# Patient Record
Sex: FEMALE | Race: WHITE | NOT HISPANIC OR LATINO | Employment: OTHER | URBAN - METROPOLITAN AREA
[De-identification: names, ages, dates, MRNs, and addresses within clinical notes are randomized per-mention and may not be internally consistent; named-entity substitution may affect disease eponyms.]

---

## 2017-08-15 ENCOUNTER — HOSPITAL ENCOUNTER (OUTPATIENT)
Dept: LAB | Facility: MEDICAL CENTER | Age: 47
End: 2017-08-15
Attending: INTERNAL MEDICINE
Payer: COMMERCIAL

## 2017-08-15 ENCOUNTER — OFFICE VISIT (OUTPATIENT)
Dept: ENDOCRINOLOGY | Facility: MEDICAL CENTER | Age: 47
End: 2017-08-15
Payer: COMMERCIAL

## 2017-08-15 VITALS
SYSTOLIC BLOOD PRESSURE: 118 MMHG | OXYGEN SATURATION: 93 % | BODY MASS INDEX: 36.97 KG/M2 | WEIGHT: 258.2 LBS | HEIGHT: 70 IN | DIASTOLIC BLOOD PRESSURE: 80 MMHG | HEART RATE: 86 BPM

## 2017-08-15 DIAGNOSIS — R10.13 EPIGASTRIC ABDOMINAL PAIN: ICD-10-CM

## 2017-08-15 DIAGNOSIS — E04.1 THYROID NODULE: ICD-10-CM

## 2017-08-15 DIAGNOSIS — R63.5 WEIGHT GAIN: ICD-10-CM

## 2017-08-15 DIAGNOSIS — E03.8 HYPOTHYROIDISM DUE TO HASHIMOTO'S THYROIDITIS: ICD-10-CM

## 2017-08-15 DIAGNOSIS — E06.3 HYPOTHYROIDISM DUE TO HASHIMOTO'S THYROIDITIS: ICD-10-CM

## 2017-08-15 LAB
T4 FREE SERPL-MCNC: 0.88 NG/DL (ref 0.53–1.43)
TSH SERPL DL<=0.005 MIU/L-ACNC: 1.16 UIU/ML (ref 0.3–3.7)

## 2017-08-15 PROCEDURE — 36415 COLL VENOUS BLD VENIPUNCTURE: CPT

## 2017-08-15 PROCEDURE — 84443 ASSAY THYROID STIM HORMONE: CPT

## 2017-08-15 PROCEDURE — 82784 ASSAY IGA/IGD/IGG/IGM EACH: CPT

## 2017-08-15 PROCEDURE — 83516 IMMUNOASSAY NONANTIBODY: CPT

## 2017-08-15 PROCEDURE — 99204 OFFICE O/P NEW MOD 45 MIN: CPT | Performed by: INTERNAL MEDICINE

## 2017-08-15 PROCEDURE — 84439 ASSAY OF FREE THYROXINE: CPT

## 2017-08-15 RX ORDER — LEVOTHYROXINE SODIUM 125 MCG
125 TABLET ORAL
COMMUNITY
End: 2017-09-05

## 2017-08-15 NOTE — MR AVS SNAPSHOT
"        Mary Hull   8/15/2017 7:20 AM   Office Visit   MRN: [id number]    Department:  Endocrinology [facility]   Dept Phone:  [phone]    Description:  Female : 1970   Provider:  Angelina Cleveland M.D.           Reason for Visit     New Patient Hashimotos      Allergies as of 8/15/2017     Allergen Noted Reactions    Sulfa Drugs 08/15/2017       rash    Tetracycline 08/15/2017   Hives    rash    Latex 08/15/2017       Tape 08/15/2017       Plastic tape      You were diagnosed with     Hypothyroidism due to Hashimoto's thyroiditis   [5933934]       Thyroid nodule   [295476]       Weight gain   [143842]       Epigastric abdominal pain   [208397]         Vital Signs     Blood Pressure Pulse Height Weight Body Mass Index Oxygen Saturation    118/80 mmHg 86 1.778 m (5' 10\") 117.119 kg (258 lb 3.2 oz) 37.05 kg/m2 93%    Smoking Status                   Never Smoker            Basic Information     Date Of Birth Sex Race Ethnicity Preferred Language    1970 Female White Non- English      Your appointments     2018  9:00 AM   Established Patient with Angelina Cleveland M.D.   [facility] ([facility])    [address], [address]  [place] [address] [phone]           You will be receiving a confirmation call a few days before your appointment from our automated call confirmation system.              Health Maintenance     Patient has no pending health maintenance at this time      Current Immunizations     No immunizations on file.      Below and/or attached are the medications your provider expects you to take. Review all of your home medications and newly ordered medications with your provider and/or pharmacist. Follow medication instructions as directed by your provider and/or pharmacist. Please keep your medication list with you and share with your provider. Update the information when medications are discontinued, doses are changed, or new " medications (including over-the-counter products) are added; and carry medication information at all times in the event of emergency situations     Allergies:  SULFA DRUGS - (reactions not documented)     TETRACYCLINE - Hives     LATEX - (reactions not documented)     TAPE - (reactions not documented)               Medications  Valid as of: August 15, 2017 -  7:57 AM    Generic Name Brand Name Tablet Size Instructions for use    Aspirin (Tab) aspirin 81 MG Take 81 mg by mouth every day.        Fexofenadine HCl   Take  by mouth.        Levothyroxine Sodium (Tab) SYNTHROID 125 MCG Take 125 mcg by mouth Every morning on an empty stomach.        Multiple Vitamin   Take  by mouth.        Omega-3 Fatty Acids   Take 2,000 mg by mouth.        .                 Medicines prescribed today were sent to:     [id number] - [place], [place] - [address]    [address] [address]    Phone: [phone] Fax: [phone]    Open 24 Hours?: No      Medication refill instructions:       If your prescription bottle indicates you have medication refills left, it is not necessary to call your provider’s office. Please contact your pharmacy and they will refill your medication.    If your prescription bottle indicates you do not have any refills left, you may request refills at any time through one of the following ways: The online [employer] system (except Urgent Care), by calling your provider’s office, or by asking your pharmacy to contact your provider’s office with a refill request. Medication refills are processed only during regular business hours and may not be available until the next business day. Your provider may request additional information or to have a follow-up visit with you prior to refilling your medication.   *Please Note: Medication refills are assigned a new Rx number when refilled electronically. Your pharmacy may indicate that no refills were authorized even though a new prescription for the same  medication is available at the pharmacy. Please request the medicine by name with the pharmacy before contacting your provider for a refill.        Your To Do List     Future Labs/Procedures Complete By Expires    CELIAC DISEASE AB PANEL  As directed 8/15/2018    FREE THYROXINE  As directed 8/15/2018    FREE THYROXINE  As directed 8/15/2018    TSH  As directed 8/15/2018    TSH  As directed 8/15/2018    URINE CORTISOL 24 HR, ICMA  As directed 8/15/2018    URINE CREATININE 24 HR  As directed 8/15/2018    US-GALLBLADDER  As directed 8/15/2018    US-SOFT TISSUES OF HEAD - NECK  As directed 8/15/2018         [employer] retargetinghart Access Code: Activation code not generated  Current [employer] Status: Active

## 2017-08-15 NOTE — PROGRESS NOTES
New Patient Consult Note  Primary care physician: Crow DE LEON M.D.    Reason for consult: Hypothyroidism    HPI:  Mary Hull is a 47 y.o. old patient who comes in today for evaluation of hypothyroidism.    Hypothyroidism: She was diagnosed with hypothyroidism over 10 years ago. She is currently on Synthroid 125 µg daily. She reports compliance with medications. She has difficulty losing weight.    Thyroid nodules: Thyroid ultrasound in 2012 showed subcentimeter thyroid nodules. No family history of thyroid cancer. She has occasional anterior neck discomfort.    Weight gain: She has gained over 20 pounds in the past year. Lab workup 2 months ago showed FSH/LH in postmenopausal range. She had hysterectomy several years ago, ovaries were left intact. She has hot flashes. She is not on hormone replacement therapy. We discussed about pros and cons of postmenopausal hormone replacement therapy. She will discuss about estrogen replacement with her OB/GYN physician.    Abdominal pain: For the past couple of years she has been experiencing episodic epigastric abdominal discomfort, associated with abdominal cramping/spasms of abdominal muscles. CT abdomen in 2016 showed tiny gallstones.    ROS:  Constitutional: Positive for weight gain  Cardiac: No palpitations or racing heart  Resp: No shortness of breath  GI: Positive for abdominal discomfort  GYN: Positive for hot flashes  All other systems were reviewed and were negative.    Past Medical History:  Patient Active Problem List    Diagnosis Date Noted   • Hypothyroidism due to Hashimoto's thyroiditis 08/15/2017   • Thyroid nodule 08/15/2017       Past Surgical History:  Past Surgical History   Procedure Laterality Date   • Vaginal hysterectomy scope total  8/22/08     Performed by KATIA PANDEY at [email or web address]   • Cystoscopy  8/22/08     Performed by KATIA PANDEY at [facility]       Allergies:  Sulfa drugs;  "Tetracycline; Latex; and Tape    Social History:  Social History     Social History   • Marital Status:      Spouse Name: N/A   • Number of Children: N/A   • Years of Education: N/A     Occupational History   • Not on file.     Social History Main Topics   • Smoking status: Never Smoker    • Smokeless tobacco: Not on file   • Alcohol Use: Yes   • Drug Use: Not on file   • Sexual Activity: Not on file     Other Topics Concern   • Not on file     Social History Narrative       Family History:  Family History   Problem Relation Age of Onset   • Cancer Mother      breast   • Heart Disease Mother    • Heart Disease Father    • Diabetes Father    • Kidney Disease Father    • Thyroid Sister        Medications:    Current outpatient prescriptions:   •  SYNTHROID 125 MCG Tab, Take 125 mcg by mouth Every morning on an empty stomach., Disp: , Rfl:   •  aspirin 81 MG tablet, Take 81 mg by mouth every day., Disp: , Rfl:   •  Omega-3 Fatty Acids (FISH OIL PO), Take 2,000 mg by mouth., Disp: , Rfl:   •  Multiple Vitamin (MULTIVITAMINS PO), Take  by mouth., Disp: , Rfl:   •  Fexofenadine HCl (ALLEGRA ALLERGY PO), Take  by mouth., Disp: , Rfl:     Labs:  Labs from May 2017: Hemoglobin 13.9, a.m. cortisol 8.3, TSH 1.26, LH 36.4, FSH 65.8, estradiol less than 5.0, total T4 10.7    Physical Examination:  Vital signs: /80 mmHg  Pulse 86  Ht 1.778 m (5' 10\")  Wt 117.119 kg (258 lb 3.2 oz)  BMI 37.05 kg/m2  SpO2 93%  General: No apparent distress, cooperative  Eyes: No scleral icterus or discharge  ENMT: Normal on external inspection of nose, lips  Neck: No abnormal masses on inspection  Resp: Normal effort, clear to auscultation bilaterally   CVS: Regular rate and rhythm, S1 S2 normal, no murmur   Extremities: No edema  Neuro: Alert and oriented  Skin: No rash  Psych: Normal mood and affect    Assessment and Plan:    1. Hypothyroidism due to Hashimoto's thyroiditis  · Labs in May showed TSH 1.26  · Continue Synthroid " 125 µg daily  · Repeat labs for TSH and free T4 now and then again in 6 months  - TSH; Future  - FREE THYROXINE; Future  - TSH; Future  - FREE THYROXINE; Future    2. Thyroid nodule  · Thyroid ultrasound in 2012 showed to subcentimeter thyroid nodules  · No family history of thyroid cancer  · We will repeat thyroid ultrasound now  - US-SOFT TISSUES OF HEAD - NECK; Future    3. Weight gain  · We will check 24-hour urine cortisol to rule out hypercortisolism, clinically appears unlikely  - URINE CREATININE 24 HR; Future  - URINE CORTISOL 24 HR, ICMA; Future    4. Epigastric abdominal pain  · We will check celiac disease panel and obtain an ultrasound of gallbladder  - US-GALLBLADDER; Future  - CELIAC DISEASE AB PANEL; Future    Return in about 6 months (around 2/15/2018).    Thank you for allowing me to participate in the care of this patient.    Angelina Cleveland M.D.  08/15/2017    CC:   Crow DE LEON M.D.    This note was created using voice recognition software (Dragon). The accuracy of the dictation is limited by the abilities of the software. I have reviewed the note prior to signing, however some errors in grammar and context are still possible. If you have any questions related to this note please do not hesitate to contact our office.

## 2017-08-16 ENCOUNTER — HOSPITAL ENCOUNTER (OUTPATIENT)
Dept: RADIOLOGY | Facility: MEDICAL CENTER | Age: 47
End: 2017-08-16
Attending: OBSTETRICS & GYNECOLOGY
Payer: COMMERCIAL

## 2017-08-16 DIAGNOSIS — Z12.31 VISIT FOR SCREENING MAMMOGRAM: ICD-10-CM

## 2017-08-16 LAB — IGA SERPL-MCNC: 246 MG/DL (ref 68–408)

## 2017-08-16 PROCEDURE — G0202 SCR MAMMO BI INCL CAD: HCPCS

## 2017-08-17 ENCOUNTER — HOSPITAL ENCOUNTER (OUTPATIENT)
Facility: MEDICAL CENTER | Age: 47
End: 2017-08-17
Attending: INTERNAL MEDICINE
Payer: COMMERCIAL

## 2017-08-17 DIAGNOSIS — R63.5 WEIGHT GAIN: ICD-10-CM

## 2017-08-17 LAB
CREAT 24H UR-MSRATE: 1751 MG/24 HR (ref 800–1800)
CREAT UR-MCNC: 152.3 MG/DL
SPECIMEN VOL UR: 1150 ML
TTG IGA SER IA-ACNC: 1 U/ML (ref 0–3)

## 2017-08-17 PROCEDURE — 82570 ASSAY OF URINE CREATININE: CPT

## 2017-08-17 PROCEDURE — 81050 URINALYSIS VOLUME MEASURE: CPT

## 2017-08-17 PROCEDURE — 82530 CORTISOL FREE: CPT

## 2017-08-20 LAB
ANNOTATION COMMENT IMP: NORMAL
CORTIS F 24H UR HPLC-MCNC: 14 UG/L
CORTIS F 24H UR-MRATE: 16.1 UG/D
CORTIS F/CREAT 24H UR: 10.37 UG/G CRT
CREAT 24H UR-MCNC: 135 MG/DL
CREAT 24H UR-MRATE: 1552 MG/D (ref 700–1600)
SPECIMEN VOL ?TM UR: 1150 ML

## 2017-08-22 ENCOUNTER — HOSPITAL ENCOUNTER (OUTPATIENT)
Dept: RADIOLOGY | Facility: MEDICAL CENTER | Age: 47
End: 2017-08-22
Attending: INTERNAL MEDICINE
Payer: COMMERCIAL

## 2017-08-22 DIAGNOSIS — E04.1 THYROID NODULE: ICD-10-CM

## 2017-08-22 DIAGNOSIS — R10.13 EPIGASTRIC ABDOMINAL PAIN: ICD-10-CM

## 2017-08-22 PROCEDURE — 76536 US EXAM OF HEAD AND NECK: CPT

## 2017-08-22 PROCEDURE — 76705 ECHO EXAM OF ABDOMEN: CPT

## 2017-09-05 DIAGNOSIS — E03.8 HYPOTHYROIDISM DUE TO HASHIMOTO'S THYROIDITIS: ICD-10-CM

## 2017-09-05 DIAGNOSIS — E06.3 HYPOTHYROIDISM DUE TO HASHIMOTO'S THYROIDITIS: ICD-10-CM

## 2017-09-05 RX ORDER — LEVOTHYROXINE SODIUM 112 UG/1
112 TABLET ORAL
Qty: 30 TAB | Refills: 6 | Status: SHIPPED | OUTPATIENT
Start: 2017-09-05 | End: 2017-10-24 | Stop reason: SDUPTHER

## 2017-09-05 RX ORDER — LIOTHYRONINE SODIUM 5 UG/1
5 TABLET ORAL 2 TIMES DAILY
Qty: 60 TAB | Refills: 6 | Status: SHIPPED | OUTPATIENT
Start: 2017-09-05 | End: 2017-10-24 | Stop reason: SDUPTHER

## 2017-10-24 DIAGNOSIS — E03.8 HYPOTHYROIDISM DUE TO HASHIMOTO'S THYROIDITIS: ICD-10-CM

## 2017-10-24 DIAGNOSIS — E06.3 HYPOTHYROIDISM DUE TO HASHIMOTO'S THYROIDITIS: ICD-10-CM

## 2017-10-24 RX ORDER — LIOTHYRONINE SODIUM 5 UG/1
5 TABLET ORAL 2 TIMES DAILY
Qty: 180 TAB | Refills: 0 | Status: SHIPPED | OUTPATIENT
Start: 2017-10-24 | End: 2018-08-22 | Stop reason: SDUPTHER

## 2017-10-24 RX ORDER — LEVOTHYROXINE SODIUM 112 UG/1
112 TABLET ORAL
Qty: 90 TAB | Refills: 0 | Status: SHIPPED | OUTPATIENT
Start: 2017-10-24 | End: 2018-01-15 | Stop reason: SDUPTHER

## 2018-01-15 DIAGNOSIS — E03.8 HYPOTHYROIDISM DUE TO HASHIMOTO'S THYROIDITIS: ICD-10-CM

## 2018-01-15 DIAGNOSIS — E06.3 HYPOTHYROIDISM DUE TO HASHIMOTO'S THYROIDITIS: ICD-10-CM

## 2018-01-16 RX ORDER — LEVOTHYROXINE SODIUM 112 MCG
112 TABLET ORAL
Qty: 90 TAB | Refills: 1 | Status: SHIPPED | OUTPATIENT
Start: 2018-01-16 | End: 2018-07-19 | Stop reason: SDUPTHER

## 2018-02-10 LAB
ERYTHROCYTE [DISTWIDTH] IN BLOOD BY AUTOMATED COUNT: 14.4 % (ref 12.3–15.4)
FERRITIN SERPL-MCNC: 207 NG/ML (ref 15–150)
HCT VFR BLD AUTO: 42.5 % (ref 34–46.6)
HGB BLD-MCNC: 14.2 G/DL (ref 11.1–15.9)
IRON SATN MFR SERPL: 24 % (ref 15–55)
IRON SERPL-MCNC: 62 UG/DL (ref 27–159)
MCH RBC QN AUTO: 29.9 PG (ref 26.6–33)
MCHC RBC AUTO-ENTMCNC: 33.4 G/DL (ref 31.5–35.7)
MCV RBC AUTO: 90 FL (ref 79–97)
NRBC BLD AUTO-RTO: NORMAL %
PLATELET # BLD AUTO: 255 X10E3/UL (ref 150–379)
RBC # BLD AUTO: 4.75 X10E6/UL (ref 3.77–5.28)
T3FREE SERPL-MCNC: 3.1 PG/ML (ref 2–4.4)
T4 FREE SERPL-MCNC: 1.29 NG/DL (ref 0.82–1.77)
TIBC SERPL-MCNC: 263 UG/DL (ref 250–450)
TSH SERPL DL<=0.005 MIU/L-ACNC: 0.59 UIU/ML (ref 0.45–4.5)
UIBC SERPL-MCNC: 201 UG/DL (ref 131–425)
WBC # BLD AUTO: 7.9 X10E3/UL (ref 3.4–10.8)

## 2018-02-14 ENCOUNTER — OFFICE VISIT (OUTPATIENT)
Dept: ENDOCRINOLOGY | Facility: MEDICAL CENTER | Age: 48
End: 2018-02-14
Payer: COMMERCIAL

## 2018-02-14 VITALS
WEIGHT: 258.6 LBS | HEIGHT: 70 IN | DIASTOLIC BLOOD PRESSURE: 78 MMHG | BODY MASS INDEX: 37.02 KG/M2 | HEART RATE: 100 BPM | OXYGEN SATURATION: 97 % | SYSTOLIC BLOOD PRESSURE: 119 MMHG

## 2018-02-14 DIAGNOSIS — E06.3 HYPOTHYROIDISM DUE TO HASHIMOTO'S THYROIDITIS: ICD-10-CM

## 2018-02-14 DIAGNOSIS — E03.8 HYPOTHYROIDISM DUE TO HASHIMOTO'S THYROIDITIS: ICD-10-CM

## 2018-02-14 DIAGNOSIS — E04.2 MULTIPLE THYROID NODULES: ICD-10-CM

## 2018-02-14 DIAGNOSIS — Z78.0 POSTMENOPAUSAL STATE: ICD-10-CM

## 2018-02-14 PROCEDURE — 99214 OFFICE O/P EST MOD 30 MIN: CPT | Performed by: INTERNAL MEDICINE

## 2018-02-14 RX ORDER — ESTRADIOL 0.05 MG/D
1 PATCH, EXTENDED RELEASE TRANSDERMAL
COMMUNITY

## 2018-02-14 NOTE — PROGRESS NOTES
"Endocrinology Clinic Progress Note    CC: Hypothyroidism    HPI:  Sae Foss is a 47 y.o. old patient who comes in today for routine follow up.     Hypothyroidism: She is currently on Synthroid 112 µg daily and Cytomel 5 µg twice a day. Reports compliance with medications. Energy levels are better.    Multiple thyroid nodules: No difficulty swallowing or breathing. Thyroid ultrasound in August of multiple subcentimeter nodules stable compared to prior ultrasound.    Postmenopausal state: She is now on estrogen patch, feels better after starting estrogen replacement. Follows with OB/GYN.    ROS:  Constitutional: No unintentional weight loss  Endo: Denies excessive thirst or frequent urination    PMH:  Patient Active Problem List   Diagnosis   • Hypothyroidism due to Hashimoto's thyroiditis   • Thyroid nodule     EXAM:  Vital signs: /78   Pulse 100   Ht 1.778 m (5' 10\")   Wt 117.3 kg (258 lb 9.6 oz)   SpO2 97%   BMI 37.11 kg/m²   General: No apparent distress, cooperative  Eyes: No scleral icterus, no discharge  Neck: Normal on external inspection  Resp: Normal effort  Musculoskeletal: Normal gait  Extremities: No lower extremity edema  Skin: No rash on visible skin  Psych: Alert and oriented, normal mood and affect    LABS:  Results for SAE FOSS (MRN [id number]) as of 2/14/2018 09:19   Ref. Range 2/9/2018 09:18   TSH Latest Ref Range: 0.450 - 4.500 uIU/mL 0.590   Free T-4 Latest Ref Range: 0.82 - 1.77 ng/dL 1.29   T3,Free Latest Ref Range: 2.0 - 4.4 pg/mL 3.1     Assessment and Plan:    1. Hypothyroidism due to Hashimoto's thyroiditis  · Continue Synthroid 112 µg daily and Cytomel 5 µg twice a day  · Repeat labs for TSH and free T4 in 6 months, and in between if needed  - TSH; Future  - FREE THYROXINE; Future  - TRIIDOTHYRONINE; Future  - TSH; Future  - FREE THYROXINE; Future  - TRIIDOTHYRONINE; Future    2. Multiple thyroid nodules  · Repeat thyroid ultrasound in August 2018  - US-SOFT " TISSUES OF HEAD - NECK; Future    3. Postmenopausal state  · Now on estrogen replacement patch    Return in about 6 months (around 8/14/2018).    Thank you for allowing me to participate in the care of this patient.    Angelina Cleveland M.D.    CC:   Crow DE LEON M.D.    This note was created using voice recognition software (Dragon). The accuracy of the dictation is limited by the abilities of the software. I have reviewed the note prior to signing, however some errors in grammar and context are still possible. If you have any questions related to this note please do not hesitate to contact our office.

## 2018-05-22 DIAGNOSIS — M79.10 MYALGIA: ICD-10-CM

## 2018-05-22 DIAGNOSIS — E03.9 ACQUIRED HYPOTHYROIDISM: ICD-10-CM

## 2018-06-09 LAB
25(OH)D3+25(OH)D2 SERPL-MCNC: 25.2 NG/ML (ref 30–100)
ALBUMIN SERPL-MCNC: 4.2 G/DL (ref 3.5–5.5)
ALBUMIN/GLOB SERPL: 1.9 {RATIO} (ref 1.2–2.2)
ALP SERPL-CCNC: 61 IU/L (ref 39–117)
ALT SERPL-CCNC: 21 IU/L (ref 0–32)
AST SERPL-CCNC: 21 IU/L (ref 0–40)
BILIRUB SERPL-MCNC: 0.3 MG/DL (ref 0–1.2)
BUN SERPL-MCNC: 14 MG/DL (ref 6–24)
BUN/CREAT SERPL: 14 (ref 9–23)
CALCIUM SERPL-MCNC: 9.2 MG/DL (ref 8.7–10.2)
CHLORIDE SERPL-SCNC: 101 MMOL/L (ref 96–106)
CK SERPL-CCNC: 157 U/L (ref 24–173)
CO2 SERPL-SCNC: 23 MMOL/L (ref 18–29)
CREAT SERPL-MCNC: 1.02 MG/DL (ref 0.57–1)
ERYTHROCYTE [SEDIMENTATION RATE] IN BLOOD BY WESTERGREN METHOD: 2 MM/HR (ref 0–32)
GFR SERPLBLD CREATININE-BSD FMLA CKD-EPI: 65 ML/MIN/1.73
GFR SERPLBLD CREATININE-BSD FMLA CKD-EPI: 75 ML/MIN/1.73
GLOBULIN SER CALC-MCNC: 2.2 G/DL (ref 1.5–4.5)
GLUCOSE SERPL-MCNC: 90 MG/DL (ref 65–99)
POTASSIUM SERPL-SCNC: 4.3 MMOL/L (ref 3.5–5.2)
PROT SERPL-MCNC: 6.4 G/DL (ref 6–8.5)
SODIUM SERPL-SCNC: 140 MMOL/L (ref 134–144)
T4 FREE SERPL-MCNC: 1.41 NG/DL (ref 0.82–1.77)
TSH SERPL DL<=0.005 MIU/L-ACNC: 0.48 UIU/ML (ref 0.45–4.5)

## 2018-06-12 DIAGNOSIS — E55.9 VITAMIN D DEFICIENCY: ICD-10-CM

## 2018-06-12 RX ORDER — ERGOCALCIFEROL 1.25 MG/1
50000 CAPSULE ORAL
Qty: 12 CAP | Refills: 0 | Status: SHIPPED | OUTPATIENT
Start: 2018-06-12 | End: 2018-09-03 | Stop reason: SDUPTHER

## 2018-07-17 ENCOUNTER — APPOINTMENT (OUTPATIENT)
Dept: RADIOLOGY | Facility: MEDICAL CENTER | Age: 48
End: 2018-07-17
Attending: INTERNAL MEDICINE
Payer: COMMERCIAL

## 2018-07-19 DIAGNOSIS — E03.8 HYPOTHYROIDISM DUE TO HASHIMOTO'S THYROIDITIS: ICD-10-CM

## 2018-07-19 DIAGNOSIS — E06.3 HYPOTHYROIDISM DUE TO HASHIMOTO'S THYROIDITIS: ICD-10-CM

## 2018-07-19 RX ORDER — LEVOTHYROXINE SODIUM 112 MCG
112 TABLET ORAL
Qty: 90 TAB | Refills: 1 | Status: SHIPPED | OUTPATIENT
Start: 2018-07-19 | End: 2019-02-06 | Stop reason: SDUPTHER

## 2018-07-19 NOTE — TELEPHONE ENCOUNTER
Was the patient seen in the last year in this department? Yes     Does patient have an active prescription for medications requested? No     Received Request Via: Pharmacy     SYNTHROID 112 MCG Tab  TAKE 1 TAB BY MOUTH EVERY MORNING ON AN EMPTY STOMACH.

## 2018-07-20 ENCOUNTER — HOSPITAL ENCOUNTER (OUTPATIENT)
Dept: RADIOLOGY | Facility: MEDICAL CENTER | Age: 48
End: 2018-07-20
Attending: INTERNAL MEDICINE
Payer: COMMERCIAL

## 2018-07-20 DIAGNOSIS — E04.2 MULTIPLE THYROID NODULES: ICD-10-CM

## 2018-07-20 PROCEDURE — 76536 US EXAM OF HEAD AND NECK: CPT

## 2018-08-06 ENCOUNTER — OFFICE VISIT (OUTPATIENT)
Dept: ENDOCRINOLOGY | Facility: MEDICAL CENTER | Age: 48
End: 2018-08-06
Payer: COMMERCIAL

## 2018-08-06 VITALS
DIASTOLIC BLOOD PRESSURE: 78 MMHG | HEART RATE: 78 BPM | OXYGEN SATURATION: 95 % | WEIGHT: 257.6 LBS | SYSTOLIC BLOOD PRESSURE: 124 MMHG | HEIGHT: 70 IN | BODY MASS INDEX: 36.88 KG/M2

## 2018-08-06 DIAGNOSIS — E03.8 HYPOTHYROIDISM DUE TO HASHIMOTO'S THYROIDITIS: ICD-10-CM

## 2018-08-06 DIAGNOSIS — E04.2 MULTIPLE THYROID NODULES: ICD-10-CM

## 2018-08-06 DIAGNOSIS — E55.9 VITAMIN D DEFICIENCY: ICD-10-CM

## 2018-08-06 DIAGNOSIS — E06.3 HYPOTHYROIDISM DUE TO HASHIMOTO'S THYROIDITIS: ICD-10-CM

## 2018-08-06 DIAGNOSIS — E66.9 OBESITY (BMI 30-39.9): ICD-10-CM

## 2018-08-06 PROCEDURE — 99214 OFFICE O/P EST MOD 30 MIN: CPT | Performed by: INTERNAL MEDICINE

## 2018-08-06 NOTE — PROGRESS NOTES
"Endocrinology Clinic Progress Note    CC: Hypothyroidism    HPI:  1. Hypothyroidism due to Hashimoto's thyroiditis  She is currently on Synthroid 112 mcg daily and Cytomel 5 mcg twice a day.  She reports compliance with medications.  Overall feels well.    2. Vitamin D deficiency  She is currently on vitamin D 50,000 IUs weekly.  She reports compliance of medications.    3. Multiple thyroid nodules  Thyroid ultrasound in July 2018 showed essentially stable appearance of thyroid nodules compared to August 2017, she did have a new subcentimeter nodule in the left thyroid lobe without any high-risk features.    4. Obesity (BMI 30-39.9)  She has been working on diet and lifestyle modification, has difficulty losing weight    ROS:  Constitutional: Negative for unintentional weight loss  Cardiac: Negative for palpitations    PMH:  Patient Active Problem List   Diagnosis   • Hypothyroidism due to Hashimoto's thyroiditis   • Thyroid nodule     EXAM:  Vital signs: /78   Pulse 78   Ht 1.778 m (5' 10\")   Wt 116.8 kg (257 lb 9.6 oz)   SpO2 95%   BMI 36.96 kg/m²   General: No apparent distress, cooperative  Eyes: No scleral icterus, no discharge  Neck: Normal on external inspection  Resp: Normal effort, clear to auscultation bilaterally  CVS: Regular rate and rhythm, S1 S2 normal  Musculoskeletal: Normal gait  Extremities: No lower extremity edema  Skin: No rash on visible skin  Psych: Alert and oriented, normal mood and affect    Assessment and Plan:    1. Hypothyroidism due to Hashimoto's thyroiditis  · Continue Synthroid 112 mcg daily and Cytomel 5 mcg twice a day  - TSH; Standing  - FREE THYROXINE; Standing    2. Vitamin D deficiency  · Continue weekly high-dose vitamin D  · Repeat labs in 6 weeks  - VITAMIN D,25 HYDROXY; Future    3. Multiple thyroid nodules  · We will repeat thyroid ultrasound in July 2019    4. Obesity (BMI 30-39.9)  - REFERRAL TO [facility] IMPROVEMENT PROGRAMS (HIP) Services Requested: " Physician Medical Weight Management Program; Reason for Referral? BMI>30; Reason for Visit: Overweight/Obesity    Return in about 6 months (around 2/6/2019).    Thank you for allowing me to participate in the care of this patient.    Angelina Cleveland M.D.    CC:   Crow DE LEON M.D.    This note was created using voice recognition software (Dragon). The accuracy of the dictation is limited by the abilities of the software. I have reviewed the note prior to signing, however some errors in grammar and context are still possible. If you have any questions related to this note please do not hesitate to contact our office.

## 2018-08-22 DIAGNOSIS — E03.8 HYPOTHYROIDISM DUE TO HASHIMOTO'S THYROIDITIS: ICD-10-CM

## 2018-08-22 DIAGNOSIS — E06.3 HYPOTHYROIDISM DUE TO HASHIMOTO'S THYROIDITIS: ICD-10-CM

## 2018-08-22 RX ORDER — LIOTHYRONINE SODIUM 5 UG/1
5 TABLET ORAL 2 TIMES DAILY
Qty: 180 TAB | Refills: 2 | Status: SHIPPED | OUTPATIENT
Start: 2018-08-22 | End: 2019-07-22 | Stop reason: SDUPTHER

## 2018-11-12 DIAGNOSIS — E55.9 VITAMIN D DEFICIENCY: ICD-10-CM

## 2018-11-12 LAB — 25(OH)D3+25(OH)D2 SERPL-MCNC: 26.5 NG/ML (ref 30–100)

## 2018-11-12 RX ORDER — ERGOCALCIFEROL 1.25 MG/1
50000 CAPSULE ORAL
Qty: 26 CAP | Refills: 0 | Status: SHIPPED | OUTPATIENT
Start: 2018-11-12 | End: 2019-04-04

## 2018-11-16 ENCOUNTER — HOSPITAL ENCOUNTER (OUTPATIENT)
Dept: RADIOLOGY | Facility: MEDICAL CENTER | Age: 48
End: 2018-11-16
Attending: OBSTETRICS & GYNECOLOGY
Payer: COMMERCIAL

## 2018-11-16 DIAGNOSIS — Z12.31 SCREENING MAMMOGRAM, ENCOUNTER FOR: ICD-10-CM

## 2018-11-16 PROCEDURE — 77067 SCR MAMMO BI INCL CAD: CPT

## 2018-12-04 DIAGNOSIS — E55.9 VITAMIN D DEFICIENCY: ICD-10-CM

## 2018-12-04 RX ORDER — ERGOCALCIFEROL 1.25 MG/1
CAPSULE ORAL
Qty: 26 CAP | Refills: 1 | Status: SHIPPED | OUTPATIENT
Start: 2018-12-04 | End: 2020-01-16

## 2019-02-05 DIAGNOSIS — E03.8 HYPOTHYROIDISM DUE TO HASHIMOTO'S THYROIDITIS: ICD-10-CM

## 2019-02-05 DIAGNOSIS — E06.3 HYPOTHYROIDISM DUE TO HASHIMOTO'S THYROIDITIS: ICD-10-CM

## 2019-02-05 RX ORDER — LEVOTHYROXINE SODIUM 112 UG/1
112 TABLET ORAL
Qty: 90 TAB | Refills: 1 | Status: CANCELLED | OUTPATIENT
Start: 2019-02-05

## 2019-02-06 DIAGNOSIS — E03.8 HYPOTHYROIDISM DUE TO HASHIMOTO'S THYROIDITIS: ICD-10-CM

## 2019-02-06 DIAGNOSIS — E06.3 HYPOTHYROIDISM DUE TO HASHIMOTO'S THYROIDITIS: ICD-10-CM

## 2019-02-06 RX ORDER — LEVOTHYROXINE SODIUM 112 UG/1
112 TABLET ORAL
Qty: 90 TAB | Refills: 0 | Status: SHIPPED | OUTPATIENT
Start: 2019-02-06 | End: 2019-05-06 | Stop reason: SDUPTHER

## 2019-02-06 NOTE — TELEPHONE ENCOUNTER
Was the patient seen in the last year in this department? Yes  **LOV 8/6/18 with Dr. Cleveland. N2U 2/26/19**    Does patient have an active prescription for medications requested? Yes    Received Request Via: Patient

## 2019-02-12 DIAGNOSIS — E55.9 VITAMIN D DEFICIENCY: ICD-10-CM

## 2019-02-12 RX ORDER — ERGOCALCIFEROL 1.25 MG/1
50000 CAPSULE ORAL
Qty: 26 CAP | Refills: 1 | OUTPATIENT
Start: 2019-02-12

## 2019-02-12 NOTE — TELEPHONE ENCOUNTER
Was the patient seen in the last year in this department? Yes  **LOV 8/6/18 w/ Dr Cleveland. N2U 2/26/19**    Does patient have an active prescription for medications requested? Yes    Received Request Via: Patient

## 2019-02-26 ENCOUNTER — OFFICE VISIT (OUTPATIENT)
Dept: ENDOCRINOLOGY | Facility: MEDICAL CENTER | Age: 49
End: 2019-02-26
Payer: COMMERCIAL

## 2019-02-26 VITALS
RESPIRATION RATE: 16 BRPM | WEIGHT: 256 LBS | DIASTOLIC BLOOD PRESSURE: 70 MMHG | OXYGEN SATURATION: 95 % | HEART RATE: 91 BPM | SYSTOLIC BLOOD PRESSURE: 120 MMHG | BODY MASS INDEX: 36.65 KG/M2 | HEIGHT: 70 IN

## 2019-02-26 DIAGNOSIS — E06.3 HYPOTHYROIDISM DUE TO HASHIMOTO'S THYROIDITIS: ICD-10-CM

## 2019-02-26 DIAGNOSIS — R53.83 FATIGUE, UNSPECIFIED TYPE: ICD-10-CM

## 2019-02-26 DIAGNOSIS — E04.1 THYROID NODULE: ICD-10-CM

## 2019-02-26 DIAGNOSIS — E66.9 CLASS 2 OBESITY WITHOUT SERIOUS COMORBIDITY IN ADULT, UNSPECIFIED BMI, UNSPECIFIED OBESITY TYPE: ICD-10-CM

## 2019-02-26 DIAGNOSIS — E03.8 HYPOTHYROIDISM DUE TO HASHIMOTO'S THYROIDITIS: ICD-10-CM

## 2019-02-26 DIAGNOSIS — E55.9 VITAMIN D DEFICIENCY: ICD-10-CM

## 2019-02-26 PROCEDURE — 99214 OFFICE O/P EST MOD 30 MIN: CPT | Performed by: PHYSICIAN ASSISTANT

## 2019-02-27 NOTE — PROGRESS NOTES
"Endocrinology Clinic Progress Note      HPI:  Mary Hull is a 49 y.o. old patient who comes in today for routine follow up.     Previously followed by Dr. Cleveland     1. Hypothyroidism due to Hashimoto's thyroiditis  She has been struggling with weight for the last several years (since hysterectomy). She is active exercising 2-3 times a week with a diet of 1700 calorie. Patient cooks her meals and tries to avoid high fatty foods.     She is currently on:   Synthroid 112 mcg daily (5-6 am)  and Cytomel 5 mcg (in the am 9 am with food and then again 4-5 pm ES. Patient is having symptoms of reflux with Cytomel.   She reports compliance with medications.  Overall feels well.     2. Thyroid nodule  According to the patient, she is feeling \"more aware\" of her thyroid and a sensation of fullness in her neck.     Patient denies  voice changes, hoarseness, neck pain, or difficulty swallowing, dysphonia, cough, enlarged cervical lymph nodes, sweating, tremors, heat intolerance.      No family history of thyroid cancer     2. Vitamin D deficiency  She is currently on vitamin D 50,000 IUs weekly (Tuesday).  She reports compliance of medications.      Endocrinology History:   1. Hypothyroidism due to Hashimoto's thyroiditis  6/18- 0.484, FT4 1.41-  2/18- 0.590, Ft4 1.29, FT3 3.1   8/15/17- TSH 1.160, FT4 0.88     2. Vitamin D deficiency  11/18- 26.5  6/18-25.2    3. Multiple thyroid nodules  Thyroid ultrasound 7/18-showed essentially stable appearance of thyroid nodules compared to August 2017, she did have a new subcentimeter nodule in the left thyroid lobe without any high-risk features.     4. Obesity (BMI 30-39.9)  Wt Readings from Last 1 Encounters:   02/26/19 116.1 kg (256 lb)     2/ lbs   8/ lbs - \" loose it diet\" 1750 diet, exercise  3 times a week not really loosing weight.      ROS:  Constitutional: No fever, chills, lightheaded, dizziness, nausea, vomiting, diarrhea, shakes, tremors, constipation " "    Medications:    Current Outpatient Prescriptions:   •  levothyroxine, 112 mcg, Oral, AM ES, 2/26/2019  •  vitamin D (Ergocalciferol), TAKE ONE CAPSULE BY MOUTH EVERY 7 DAYS, 2/26/2019  •  liothyronine, 5 mcg, Oral, BID, 2/26/2019  •  estradiol, 1 Patch, Transdermal, Q7 DAYS, 2/26/2019  •  aspirin, 81 mg, Oral, DAILY, 2/26/2019  •  Omega-3 Fatty Acids (FISH OIL PO), Take 2,000 mg by mouth., 2/26/2019  •  Multiple Vitamin (MULTIVITAMINS PO), Take  by mouth., 2/26/2019  •  Fexofenadine HCl (ALLEGRA ALLERGY PO), Take  by mouth., 2/26/2019  •  vitamin D (Ergocalciferol), 50,000 Units, Oral, 2X A WEEK    EXAM:  Vital signs: /70 (BP Location: Right arm, Patient Position: Sitting, BP Cuff Size: Large adult)   Pulse 91   Resp 16   Ht 1.778 m (5' 10\")   Wt 116.1 kg (256 lb)   SpO2 95%   BMI 36.73 kg/m²   General: No apparent distress, cooperative, pleasant   Eyes: No scleral icterus, no discharge  Thyroid: enlarged no bruits   Resp: Normal effort no audible wheeze, talking in full sentences.   Psych: Alert and oriented, normal mood and affect    Assessment and Plan:    1. Hypothyroidism due to Hashimoto's thyroiditis  Synthroid 112 mcg daily (5-6 am)  and Cytomel 5 mcg (in the am 9 am with food and then again 4-5 pm ES.    Ordered labs for review at next appt   - TSH; Future  - TRIIDOTHYRONINE; Future  - FREE THYROXINE  - T3 FREE; Future    2. Thyroid nodule  Repeat July 2019     3. Vitamin D deficiency  Continue Vitamin D replacement   - VITAMIN D,25 HYDROXY; Future    4. Class 2 obesity without serious comorbidity in adult, unspecified BMI, unspecified obesity type  Discussed in great length with the patient today diet and exercise requirements.   Will refer to diet/nutritionist     - Lipid Profile; Future  - Comp Metabolic Panel; Future  - HEMOGLOBIN A1C; Future  - REFERRAL TO [facility] HEALTH IMPROVEMENT PROGRAMS (HIP) Services Requested: Registered Dietitian Medicare Obesity Counseling; Reason for Visit: " Overweight/Obesity, BMI of 25 or higher and A1C of 5.7-6.4    5. Fatigue, unspecified type  - VITAMIN B12; Future    Return in about 4 weeks (around 3/26/2019).    Thank you for allowing me to participate in the care of this patient.    Tessa Shaver P.A.-C.    CC:   Crow DE LEON M.D.    This note was created using voice recognition software (Dragon). The accuracy of the dictation is limited by the abilities of the software. I have reviewed the note prior to signing, however some errors in grammar and context are still possible. If you have any questions related to this note please do not hesitate to contact our office.

## 2019-02-27 NOTE — PROGRESS NOTES
I called patient and left a vm letting her know to call back and schedule her 4 week appointment.

## 2019-03-09 LAB
25(OH)D3+25(OH)D2 SERPL-MCNC: 32.2 NG/ML (ref 30–100)
ALBUMIN SERPL-MCNC: 4 G/DL (ref 3.5–5.5)
ALBUMIN/GLOB SERPL: 1.7 {RATIO} (ref 1.2–2.2)
ALP SERPL-CCNC: 57 IU/L (ref 39–117)
ALT SERPL-CCNC: 23 IU/L (ref 0–32)
AST SERPL-CCNC: 23 IU/L (ref 0–40)
BILIRUB SERPL-MCNC: 0.4 MG/DL (ref 0–1.2)
BUN SERPL-MCNC: 15 MG/DL (ref 6–24)
BUN/CREAT SERPL: 15 (ref 9–23)
CALCIUM SERPL-MCNC: 9 MG/DL (ref 8.7–10.2)
CHLORIDE SERPL-SCNC: 104 MMOL/L (ref 96–106)
CHOLEST SERPL-MCNC: 177 MG/DL (ref 100–199)
CO2 SERPL-SCNC: 21 MMOL/L (ref 20–29)
CREAT SERPL-MCNC: 1.01 MG/DL (ref 0.57–1)
GLOBULIN SER CALC-MCNC: 2.3 G/DL (ref 1.5–4.5)
GLUCOSE SERPL-MCNC: 89 MG/DL (ref 65–99)
HBA1C MFR BLD: 6 % (ref 4.8–5.6)
HDLC SERPL-MCNC: 40 MG/DL
LABORATORY COMMENT REPORT: ABNORMAL
LDLC SERPL CALC-MCNC: 115 MG/DL (ref 0–99)
POTASSIUM SERPL-SCNC: 4.6 MMOL/L (ref 3.5–5.2)
PROT SERPL-MCNC: 6.3 G/DL (ref 6–8.5)
SODIUM SERPL-SCNC: 140 MMOL/L (ref 134–144)
T3 SERPL-MCNC: 141 NG/DL (ref 71–180)
T3FREE SERPL-MCNC: 3.3 PG/ML (ref 2–4.4)
T4 FREE SERPL-MCNC: 1.25 NG/DL (ref 0.82–1.77)
TRIGL SERPL-MCNC: 109 MG/DL (ref 0–149)
TSH SERPL DL<=0.005 MIU/L-ACNC: 0.43 UIU/ML (ref 0.45–4.5)
VIT B12 SERPL-MCNC: 617 PG/ML (ref 232–1245)
VLDLC SERPL CALC-MCNC: 22 MG/DL (ref 5–40)

## 2019-03-11 PROBLEM — E55.9 VITAMIN D DEFICIENCY: Status: ACTIVE | Noted: 2019-03-11

## 2019-03-11 PROBLEM — E66.9 CLASS 2 OBESITY WITHOUT SERIOUS COMORBIDITY IN ADULT: Status: ACTIVE | Noted: 2019-03-11

## 2019-03-11 PROBLEM — E66.812 CLASS 2 OBESITY WITHOUT SERIOUS COMORBIDITY IN ADULT: Status: ACTIVE | Noted: 2019-03-11

## 2019-03-11 NOTE — ASSESSMENT & PLAN NOTE
"  Wt Readings from Last 1 Encounters:   02/26/19 116.1 kg (256 lb)     2/ lbs   8/ lbs - \" loose it diet\" 1750 diet, exercise  3 times a week not really loosing weight.  "

## 2019-03-11 NOTE — ASSESSMENT & PLAN NOTE
Thyroid ultrasound 7/18-showed essentially stable appearance of thyroid nodules compared to August 2017, she did have a new subcentimeter nodule in the left thyroid lobe without any high-risk features.

## 2019-03-22 ENCOUNTER — NON-PROVIDER VISIT (OUTPATIENT)
Dept: HEALTH INFORMATION MANAGEMENT | Facility: MEDICAL CENTER | Age: 49
End: 2019-03-22
Payer: COMMERCIAL

## 2019-03-22 VITALS — BODY MASS INDEX: 37.24 KG/M2 | HEIGHT: 70 IN | WEIGHT: 260.1 LBS

## 2019-03-22 DIAGNOSIS — E66.09 CLASS 2 OBESITY DUE TO EXCESS CALORIES WITHOUT SERIOUS COMORBIDITY IN ADULT, UNSPECIFIED BMI: ICD-10-CM

## 2019-03-22 PROCEDURE — 97802 MEDICAL NUTRITION INDIV IN: CPT | Performed by: DIETITIAN, REGISTERED

## 2019-03-22 NOTE — PROGRESS NOTES
"3/22/2019    Crow DE LEON M.D.  49 y.o.   Time in/out: 112 - 1299    Anthropometrics/Objective  Vitals:    03/22/19 1310   Weight: 118 kg (260 lb 1.6 oz)   Height: 1.778 m (5' 10\")       Body mass index is 37.32 kg/m².    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    Biochemical data, medical test and procedures  Lab Results   Component Value Date/Time    HBA1C 6.0 (H) 03/08/2019 10:15 AM     Lab Results   Component Value Date/Time    CHOLSTRLTOT 177 03/08/2019 10:15 AM     (H) 03/08/2019 10:15 AM    HDL 40 03/08/2019 10:15 AM    TRIGLYCERIDE 109 03/08/2019 10:15 AM         Nutrition Intervention  Nutrition Prescription  Recommended Daily Kcals 7769-7798    Meal and Snack  Recommend a general/healthful diet    Comprehensive Nutrition education Instruction or training leading to in-depth nutrition related knowledge about:  Combine carb, protein and fat at each meal, Meal timing and spacing, Metabolism of carb, protein, fat, Portion control and Handouts provided regarding topics discussed    Monitoring & Evaluation Plan  Behavioral-Environmental:  Behavior:  Continue tracking your food intake  Physical activity:  Continue as tolerated    Food / Nutrient Intake:  Energy intake:  5473-0980 kcal/day    Physical Signs / Symptoms:  Weight change:  Weight loss to goal      Assessment Notes:  My goal is to help Mary improve her health and hopefully she can lose some weight as well.  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    I also helped Mary adjust the goals in her food tracking kelley because it was set at 1,700 kcal, and I do not think that is actually enough kcal for her.  We confirmed this through the [employer] weight loss calculator and she agrees that she likely needs to increase her kcal intake.  It is worth a shot to see if this makes a difference for her and we can make adjustments in the future, if necessary.  I want to see her again in one month to see how she is doing.

## 2019-03-28 ENCOUNTER — APPOINTMENT (OUTPATIENT)
Dept: ENDOCRINOLOGY | Facility: MEDICAL CENTER | Age: 49
End: 2019-03-28

## 2019-04-04 ENCOUNTER — OFFICE VISIT (OUTPATIENT)
Dept: ENDOCRINOLOGY | Facility: MEDICAL CENTER | Age: 49
End: 2019-04-04
Payer: COMMERCIAL

## 2019-04-04 VITALS
BODY MASS INDEX: 36.94 KG/M2 | HEART RATE: 87 BPM | SYSTOLIC BLOOD PRESSURE: 110 MMHG | DIASTOLIC BLOOD PRESSURE: 70 MMHG | HEIGHT: 70 IN | WEIGHT: 258 LBS | OXYGEN SATURATION: 98 %

## 2019-04-04 DIAGNOSIS — E06.3 HYPOTHYROIDISM DUE TO HASHIMOTO'S THYROIDITIS: ICD-10-CM

## 2019-04-04 DIAGNOSIS — E03.8 HYPOTHYROIDISM DUE TO HASHIMOTO'S THYROIDITIS: ICD-10-CM

## 2019-04-04 DIAGNOSIS — E04.1 THYROID NODULE: ICD-10-CM

## 2019-04-04 DIAGNOSIS — R73.03 PRE-DIABETES: ICD-10-CM

## 2019-04-04 DIAGNOSIS — E66.9 CLASS 2 OBESITY WITHOUT SERIOUS COMORBIDITY IN ADULT, UNSPECIFIED BMI, UNSPECIFIED OBESITY TYPE: ICD-10-CM

## 2019-04-04 DIAGNOSIS — E55.9 VITAMIN D DEFICIENCY: ICD-10-CM

## 2019-04-04 PROCEDURE — 99214 OFFICE O/P EST MOD 30 MIN: CPT | Performed by: PHYSICIAN ASSISTANT

## 2019-04-04 RX ORDER — METFORMIN HYDROCHLORIDE 500 MG/1
500 TABLET, EXTENDED RELEASE ORAL 2 TIMES DAILY
Qty: 60 TAB | Refills: 1 | Status: SHIPPED | OUTPATIENT
Start: 2019-04-04 | End: 2019-05-14 | Stop reason: SDUPTHER

## 2019-04-04 NOTE — PROGRESS NOTES
"Endocrinology Clinic Progress Note  PCP: Crow DE LEON M.D.    HPI:  Mary Hull is a 49 y.o. old patient who comes in today for review of endocrine problems.    1. Hypothyroidism due to Hashimoto's thyroiditis  Synthroid 112 mcg daily (5-6 am)  and Cytomel 5 mcg (in the am 9 am with food and then again 7 pm ES.  She does notice when she takes her Cytomel that late in the afternoon she does have symptoms of reflux.  Her majority of her symptoms of fatigue usually are around 4 PM.    2. Thyroid nodule  Patient denies symptoms of sensation of a throat mass, voice changes, hoarseness, neck pain, or difficulty swallowing, dysphonia, cough, enlarged cervical lymph nodes, sweating, tremors, heat intolerance    3. Vitamin D deficiency  She is currently on vitamin D replacement    4. Class 2 obesity without serious comorbidity in adult, unspecified BMI, unspecified obesity type  She recently met with Fritz from nutritionist he has given her diet with macro micro changes.  Healthy snacking.  She is scheduled to meet with him in the near future.    She is here to review her labs.    Endocrine history to date:   1. Hypothyroidism due to Hashimoto's thyroiditis  3/8/2019- 0.429, FT4 1.25, FT  6/18- 0.484, FT4 1.41-  2/18- 0.590, Ft4 1.29, FT3 3.1   8/15/17- TSH 1.160, FT4 0.88      2. Vitamin D deficiency  3/8/2019 vitamin D 32.2  3/8/2019- 17083 IU weekly    11/18- 26.5  6/18-25.2     3. Multiple thyroid nodules  Thyroid ultrasound 7/18-showed essentially stable appearance of thyroid nodules compared to August 2017, she did have a new subcentimeter nodule in the left thyroid lobe without any high-risk features.     4. Obesity (BMI 30-39.9)  4/19 weight 258 pounds  2/26/2019-256  8/ lbs - \" loose it diet\" 1750 diet, exercise  3 times a week not really loosing weight.    Patient has been following with Fritz from nutrition and diabetes    5.  Prediabetes   3/8/2019 glucose 89, A1c 6.0,      6.  Elevated " LDL-   3/8/2019 total cholesterol 177, triglycerides 109, HDL 40, ,    7.  Fatigue  3/8/2019 vitamin     ROS:  Constitutional: No weight loss or gain,  fever  HEENT: No difficulty with swallowing, change in voice, or swelling in throat area   Cardiac: No chest pain, palpitations, or racing heart  Resp: No shortness of breath  GI: No abdominal pain, nausea, vomiting, or diarrhea   Neuro: No numbness or tinging in feet  Endo: No heat or cold intolerance, no polyuria or polydipsia      Past Medical History:  Patient Active Problem List    Diagnosis Date Noted   • Vitamin D deficiency 03/11/2019   • Class 2 obesity without serious comorbidity in adult 03/11/2019   • Hypothyroidism due to Hashimoto's thyroiditis 08/15/2017   • Thyroid nodule 08/15/2017     Social History     Social History   • Marital status:      Spouse name: N/A   • Number of children: N/A   • Years of education: N/A     Occupational History   • Not on file.     Social History Main Topics   • Smoking status: Never Smoker   • Smokeless tobacco: Never Used   • Alcohol use Yes   • Drug use: No   • Sexual activity: Not on file     Other Topics Concern   • Not on file     Social History Narrative   • No narrative on file     Family History   Problem Relation Age of Onset   • Cancer Mother         breast   • Heart Disease Mother    • Heart Disease Father    • Diabetes Father    • Kidney Disease Father    • Thyroid Sister          Medications:    Current Outpatient Prescriptions:   •  metFORMIN ER (GLUCOPHAGE XR) 500 MG TABLET SR 24 HR, Take 1 Tab by mouth 2 times a day., Disp: 60 Tab, Rfl: 1  •  levothyroxine (SYNTHROID) 112 MCG Tab, Take 1 Tab by mouth Every morning on an empty stomach., Disp: 90 Tab, Rfl: 0  •  vitamin D, Ergocalciferol, (DRISDOL) 90983 units Cap capsule, TAKE ONE CAPSULE BY MOUTH EVERY 7 DAYS, Disp: 26 Cap, Rfl: 1  •  liothyronine (CYTOMEL) 5 MCG Tab, Take 1 Tab by mouth 2 Times a Day., Disp: 180 Tab, Rfl: 2  •   "estradiol (VIVELLE DOT) 0.05 MG/24HR PATCH BIWEEKLY, Apply 1 Patch to skin as directed every 7 days., Disp: , Rfl:   •  aspirin 81 MG tablet, Take 81 mg by mouth every day., Disp: , Rfl:   •  Omega-3 Fatty Acids (FISH OIL PO), Take 2,000 mg by mouth., Disp: , Rfl:   •  Multiple Vitamin (MULTIVITAMINS PO), Take  by mouth., Disp: , Rfl:   •  Fexofenadine HCl (ALLEGRA ALLERGY PO), Take  by mouth., Disp: , Rfl:     Labs: Reviewed as above     Physical Examination:  Vital signs: /70 (BP Location: Left arm, Patient Position: Sitting, BP Cuff Size: Large adult)   Pulse 87   Ht 1.778 m (5' 10\")   Wt 117 kg (258 lb)   SpO2 98%   BMI 37.02 kg/m²  Body mass index is 37.02 kg/m².  General: No apparent distress, cooperative, well dressed   Eyes: No scleral icterus, no discharge, normal eyelids  Neck: No abnormal masses on inspection, normal thyroid exam  Resp: Normal effort, clear to auscultation bilaterally  CVS: Regular rate and rhythm, S1 S2 normal, no murmur  Extremities: No lower extremity edema  Abdomen: abdominal obesity present  Musculoskeletal: Normal digits and nails  Skin: No rash on visible skin  Psych: Alert and oriented, normal mood and affect, intact memory and able to make informed decisions.    Assessment and Plan:  1. Hypothyroidism due to Hashimoto's thyroiditis  Chronic condition managed with current medical regimen continue her current dose of Synthroid  - FREE THYROXINE; Future  - T3 FREE; Future  - TSH; Future  - TRIIDOTHYRONINE; Future    2. Thyroid nodule  Chronic stable condition managed with ultrasound surveillance  - US-SOFT TISSUES OF HEAD - NECK; Future    3. Vitamin D deficiency  Stable I have encouraged patient to take 2000 IU vitamin D drops    4. Class 2 obesity without serious comorbidity in adult, unspecified BMI, unspecified obesity type  Stable patient is currently working with Fritz with regards to weight management.  We did potentially discuss the addition of a GLP/phentermine " in the future to see if we can stimulate her metabolic system with regards to weight loss.  She is doing a phenomenal job with regards to diet and exercise however is extremely frustrated with regards to weight and prediabetes.  - Comp Metabolic Panel; Future    5. Pre-diabetes  New condition for patient.  A1c 6.0.  Glucose was less than 100.  We discussed the potential of insulin resistance and at this point I would like to start her on metformin to see if this stimulates some weight loss and potentially help with insulin resistance.  We reviewed side effect profile with regards to metformin.  She is agreeable.    - metFORMIN ER (GLUCOPHAGE XR) 500 MG TABLET SR 24 HR; Take 1 Tab by mouth 2 times a day.  Dispense: 60 Tab; Refill: 1  - HEMOGLOBIN A1C; Future    Return in about 3 months (around 7/4/2019).    Thank you for allowing me to participate in the care of this patient.  If you have any questions or concerns please do not hesitate to contact me.    Tessa Shaver P.A.-C.    CC:   Crow DE LEON M.D.    This note was created using voice recognition software (Dragon). The accuracy of the dictation is limited by the abilities of the software. I have reviewed the note prior to signing, however some errors in grammar and context are still possible. If you have any questions related to this note please do not hesitate to contact our office.

## 2019-04-19 ENCOUNTER — NON-PROVIDER VISIT (OUTPATIENT)
Dept: HEALTH INFORMATION MANAGEMENT | Facility: MEDICAL CENTER | Age: 49
End: 2019-04-19
Payer: COMMERCIAL

## 2019-04-19 VITALS — HEIGHT: 70 IN | BODY MASS INDEX: 36.46 KG/M2 | WEIGHT: 254.7 LBS

## 2019-04-19 DIAGNOSIS — E66.09 CLASS 2 OBESITY DUE TO EXCESS CALORIES WITHOUT SERIOUS COMORBIDITY IN ADULT, UNSPECIFIED BMI: ICD-10-CM

## 2019-04-19 PROCEDURE — 97803 MED NUTRITION INDIV SUBSEQ: CPT | Performed by: DIETITIAN, REGISTERED

## 2019-04-19 NOTE — PROGRESS NOTES
"Nutrition Reassess    4/19/2019    Crow DE LEON M.D.   49 y.o.   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    Previous weight/date: 260.1#  Change:  - 5.4#       ReAssesment/Notes:  Mary is still tracking and learning about the CHO content of foods and has been avoiding eating large amount of CHO-containing foods, like fruit.  She is feeling very comfortable with her current eating habits and wants to continue them at this time.     She likely has lost some weight by decreasing her CHO intake as well as the addition of metformin.  She is feeling more comfortable with taking the medication after initially \"freaking out\" about being given the medication.    PLAN:   1.  Continue to track you intake and learn about how much you are eating.   2.  Continue to exercise as tolerated.    Follow-up: 1 month    "

## 2019-05-06 ENCOUNTER — PATIENT MESSAGE (OUTPATIENT)
Dept: ENDOCRINOLOGY | Facility: MEDICAL CENTER | Age: 49
End: 2019-05-06

## 2019-05-06 DIAGNOSIS — E03.8 HYPOTHYROIDISM DUE TO HASHIMOTO'S THYROIDITIS: ICD-10-CM

## 2019-05-06 DIAGNOSIS — E06.3 HYPOTHYROIDISM DUE TO HASHIMOTO'S THYROIDITIS: ICD-10-CM

## 2019-05-06 RX ORDER — LEVOTHYROXINE SODIUM 112 UG/1
112 TABLET ORAL
Qty: 90 TAB | Refills: 0 | Status: SHIPPED | OUTPATIENT
Start: 2019-05-06 | End: 2019-07-22

## 2019-05-06 NOTE — PATIENT COMMUNICATION
Was the patient seen in the last year in this department? Yes    Does patient have an active prescription for medications requested? No     Received Request Via: Patient     Levothyroxine 112mcg

## 2019-05-06 NOTE — TELEPHONE ENCOUNTER
From: Sae Hull  To: Tessa Shaver P.A.-C.  Sent: 5/6/2019 8:29 AM PDT  Subject: Prescription Question    Good day !  I'm have zero refills on my 112 mcg SYNTHROID. Could you please refill this RX ASAP? My pharmacy is [facility] on [address].   Thanks and have an awesome day !   SAE

## 2019-05-14 DIAGNOSIS — R73.03 PRE-DIABETES: ICD-10-CM

## 2019-05-14 RX ORDER — METFORMIN HYDROCHLORIDE 500 MG/1
500 TABLET, EXTENDED RELEASE ORAL 2 TIMES DAILY
Qty: 60 TAB | Refills: 0 | Status: SHIPPED | OUTPATIENT
Start: 2019-05-14 | End: 2019-06-25 | Stop reason: SDUPTHER

## 2019-05-14 NOTE — TELEPHONE ENCOUNTER
Was the patient seen in the last year in this department? Yes    Does patient have an active prescription for medications requested? Yes    Received Request Via: Pharmacy       Last OV 04/04/19  Last Labs 03/08/19

## 2019-05-17 ENCOUNTER — NON-PROVIDER VISIT (OUTPATIENT)
Dept: HEALTH INFORMATION MANAGEMENT | Facility: MEDICAL CENTER | Age: 49
End: 2019-05-17
Payer: COMMERCIAL

## 2019-05-17 VITALS — WEIGHT: 251 LBS | BODY MASS INDEX: 35.93 KG/M2 | HEIGHT: 70 IN

## 2019-05-17 DIAGNOSIS — E66.09 CLASS 2 OBESITY DUE TO EXCESS CALORIES WITHOUT SERIOUS COMORBIDITY IN ADULT, UNSPECIFIED BMI: ICD-10-CM

## 2019-05-17 PROCEDURE — 97803 MED NUTRITION INDIV SUBSEQ: CPT | Performed by: DIETITIAN, REGISTERED

## 2019-05-17 NOTE — PROGRESS NOTES
"Nutrition Reassess    5/17/2019    Crow DE LEON M.D.   49 y.o.   Time in/out:  928 - 946    Subjective:  -States she is really busy with her son graduating from high school and busy at work  -Has felt tired for ~2 weeks at about 1530 every day  -Has not been sleeping well for ~2 weeks d/t everything going on  -States her clothing is fitting better  -Has found that eating protein in the morning has been very important to hunger and energy levels    Anthropometrics/Objective  Vitals:    05/17/19 0948   Weight: 113.9 kg (251 lb)   Height: 1.778 m (5' 10\")     Body mass index is 36.01 kg/m².    Previous weight/date: 254.7#  Change:  - 3.7# (- 9.1# total)     ReAssesment/Notes:  Mary's fatigue in the afternoon is likely d/t the normal circadian rhythm of the body coupled with her poor sleep recently; she can make it through the fatigue by taking a quick walk or eating her scheduled snack with some protein.    For this next month I want Mary to focus more on her mental health, because she occasionally gets discouraged and does not want to track her food intake and then feels bad.  I have given her permission to take \"mental health days\" where she does not track while keeping her eating habits similar to when she does.  She also is going to not weigh herself weekly if she is focusing too much on her weight and not as much on other non-scale victories, like her clothes fitting better.    We will see each other again in a few weeks, before she goes to [place] for parent orientation at [employer].    Follow-up: 1 month    "

## 2019-06-14 ENCOUNTER — APPOINTMENT (OUTPATIENT)
Dept: HEALTH INFORMATION MANAGEMENT | Facility: MEDICAL CENTER | Age: 49
End: 2019-06-14

## 2019-06-25 DIAGNOSIS — R73.03 PRE-DIABETES: ICD-10-CM

## 2019-06-25 RX ORDER — METFORMIN HYDROCHLORIDE 500 MG/1
500 TABLET, EXTENDED RELEASE ORAL 2 TIMES DAILY
Qty: 60 TAB | Refills: 6 | Status: SHIPPED | OUTPATIENT
Start: 2019-06-25 | End: 2020-01-21 | Stop reason: SDUPTHER

## 2019-06-25 NOTE — TELEPHONE ENCOUNTER
Was the patient seen in the last year in this department? Yes    Does patient have an active prescription for medications requested? Yes    Received Request Via: Patient     **Last office visit 4/4/19**

## 2019-07-08 ENCOUNTER — APPOINTMENT (OUTPATIENT)
Dept: ENDOCRINOLOGY | Facility: MEDICAL CENTER | Age: 49
End: 2019-07-08

## 2019-07-19 LAB
ALBUMIN SERPL-MCNC: 4.5 G/DL (ref 3.5–5.5)
ALBUMIN/GLOB SERPL: 2 {RATIO} (ref 1.2–2.2)
ALP SERPL-CCNC: 55 IU/L (ref 39–117)
ALT SERPL-CCNC: 24 IU/L (ref 0–32)
AST SERPL-CCNC: 24 IU/L (ref 0–40)
BILIRUB SERPL-MCNC: 0.4 MG/DL (ref 0–1.2)
BUN SERPL-MCNC: 17 MG/DL (ref 6–24)
BUN/CREAT SERPL: 15 (ref 9–23)
CALCIUM SERPL-MCNC: 9.3 MG/DL (ref 8.7–10.2)
CHLORIDE SERPL-SCNC: 102 MMOL/L (ref 96–106)
CO2 SERPL-SCNC: 22 MMOL/L (ref 20–29)
CREAT SERPL-MCNC: 1.15 MG/DL (ref 0.57–1)
GLOBULIN SER CALC-MCNC: 2.2 G/DL (ref 1.5–4.5)
GLUCOSE SERPL-MCNC: 95 MG/DL (ref 65–99)
HBA1C MFR BLD: 6 % (ref 4.8–5.6)
POTASSIUM SERPL-SCNC: 4.4 MMOL/L (ref 3.5–5.2)
PROT SERPL-MCNC: 6.7 G/DL (ref 6–8.5)
SODIUM SERPL-SCNC: 138 MMOL/L (ref 134–144)
T3 SERPL-MCNC: 108 NG/DL (ref 71–180)
T3FREE SERPL-MCNC: 2.7 PG/ML (ref 2–4.4)
T4 FREE SERPL-MCNC: 1.48 NG/DL (ref 0.82–1.77)
TSH SERPL DL<=0.005 MIU/L-ACNC: 0.62 UIU/ML (ref 0.45–4.5)

## 2019-07-22 ENCOUNTER — HOSPITAL ENCOUNTER (OUTPATIENT)
Dept: RADIOLOGY | Facility: MEDICAL CENTER | Age: 49
End: 2019-07-22
Attending: PHYSICIAN ASSISTANT
Payer: COMMERCIAL

## 2019-07-22 ENCOUNTER — OFFICE VISIT (OUTPATIENT)
Dept: ENDOCRINOLOGY | Facility: MEDICAL CENTER | Age: 49
End: 2019-07-22
Payer: COMMERCIAL

## 2019-07-22 VITALS
OXYGEN SATURATION: 96 % | HEIGHT: 70 IN | WEIGHT: 248 LBS | DIASTOLIC BLOOD PRESSURE: 68 MMHG | HEART RATE: 91 BPM | BODY MASS INDEX: 35.5 KG/M2 | SYSTOLIC BLOOD PRESSURE: 100 MMHG

## 2019-07-22 DIAGNOSIS — R73.03 PRE-DIABETES: ICD-10-CM

## 2019-07-22 DIAGNOSIS — E04.1 THYROID NODULE: ICD-10-CM

## 2019-07-22 DIAGNOSIS — E55.9 VITAMIN D DEFICIENCY: ICD-10-CM

## 2019-07-22 DIAGNOSIS — E03.8 HYPOTHYROIDISM DUE TO HASHIMOTO'S THYROIDITIS: ICD-10-CM

## 2019-07-22 DIAGNOSIS — E06.3 HYPOTHYROIDISM DUE TO HASHIMOTO'S THYROIDITIS: ICD-10-CM

## 2019-07-22 DIAGNOSIS — E66.9 CLASS 2 OBESITY WITHOUT SERIOUS COMORBIDITY IN ADULT, UNSPECIFIED BMI, UNSPECIFIED OBESITY TYPE: ICD-10-CM

## 2019-07-22 PROCEDURE — 76536 US EXAM OF HEAD AND NECK: CPT

## 2019-07-22 PROCEDURE — 99214 OFFICE O/P EST MOD 30 MIN: CPT | Performed by: PHYSICIAN ASSISTANT

## 2019-07-22 RX ORDER — LEVOTHYROXINE SODIUM 112 MCG
112 TABLET ORAL
Qty: 90 TAB | Refills: 6 | Status: SHIPPED | OUTPATIENT
Start: 2019-07-22 | End: 2020-01-21 | Stop reason: SDUPTHER

## 2019-07-22 RX ORDER — LIOTHYRONINE SODIUM 5 UG/1
5 TABLET ORAL 2 TIMES DAILY
Qty: 180 TAB | Refills: 2 | Status: SHIPPED | OUTPATIENT
Start: 2019-07-22 | End: 2020-01-21 | Stop reason: SDUPTHER

## 2019-12-27 ENCOUNTER — HOSPITAL ENCOUNTER (OUTPATIENT)
Dept: RADIOLOGY | Facility: MEDICAL CENTER | Age: 49
End: 2019-12-27
Attending: OBSTETRICS & GYNECOLOGY
Payer: COMMERCIAL

## 2019-12-27 DIAGNOSIS — Z12.31 SCREENING MAMMOGRAM FOR HIGH-RISK PATIENT: ICD-10-CM

## 2019-12-27 PROCEDURE — 77063 BREAST TOMOSYNTHESIS BI: CPT

## 2020-01-16 DIAGNOSIS — E55.9 VITAMIN D DEFICIENCY: ICD-10-CM

## 2020-01-16 RX ORDER — ERGOCALCIFEROL 1.25 MG/1
CAPSULE ORAL
Qty: 12 CAP | Refills: 4 | Status: SHIPPED | OUTPATIENT
Start: 2020-01-16 | End: 2020-01-21 | Stop reason: SDUPTHER

## 2020-01-16 NOTE — TELEPHONE ENCOUNTER
Was the patient seen in the last year in this department? Yes    Does patient have an active prescription for medications requested? Yes    Received Request Via: Pharmacy     [facility] w/ Dr. Cardoza 1/21/20    ergocalciferol (DRISDOL) 08926 UNIT capsule        Sig: TAKE ONE CAPSULE BY MOUTH EVERY 7 DAYS

## 2020-01-21 ENCOUNTER — OFFICE VISIT (OUTPATIENT)
Dept: ENDOCRINOLOGY | Facility: MEDICAL CENTER | Age: 50
End: 2020-01-21
Payer: COMMERCIAL

## 2020-01-21 VITALS
BODY MASS INDEX: 35.22 KG/M2 | HEIGHT: 70 IN | OXYGEN SATURATION: 96 % | DIASTOLIC BLOOD PRESSURE: 68 MMHG | WEIGHT: 246 LBS | SYSTOLIC BLOOD PRESSURE: 108 MMHG | HEART RATE: 90 BPM

## 2020-01-21 DIAGNOSIS — E06.3 HYPOTHYROIDISM DUE TO HASHIMOTO'S THYROIDITIS: ICD-10-CM

## 2020-01-21 DIAGNOSIS — E04.1 THYROID NODULE: ICD-10-CM

## 2020-01-21 DIAGNOSIS — E55.9 VITAMIN D DEFICIENCY: ICD-10-CM

## 2020-01-21 DIAGNOSIS — E03.8 HYPOTHYROIDISM DUE TO HASHIMOTO'S THYROIDITIS: ICD-10-CM

## 2020-01-21 DIAGNOSIS — R73.03 PRE-DIABETES: ICD-10-CM

## 2020-01-21 PROCEDURE — 99214 OFFICE O/P EST MOD 30 MIN: CPT | Performed by: INTERNAL MEDICINE

## 2020-01-21 RX ORDER — LEVOTHYROXINE SODIUM 112 MCG
112 TABLET ORAL
Qty: 90 TAB | Refills: 2 | Status: SHIPPED | OUTPATIENT
Start: 2020-01-21 | End: 2020-09-04

## 2020-01-21 RX ORDER — ERGOCALCIFEROL 1.25 MG/1
CAPSULE ORAL
Qty: 12 CAP | Refills: 5 | Status: SHIPPED | OUTPATIENT
Start: 2020-01-21 | End: 2021-01-25 | Stop reason: SDUPTHER

## 2020-01-21 RX ORDER — METFORMIN HYDROCHLORIDE 500 MG/1
500 TABLET, EXTENDED RELEASE ORAL 2 TIMES DAILY
Qty: 60 TAB | Refills: 6 | Status: SHIPPED | OUTPATIENT
Start: 2020-01-21 | End: 2020-07-21 | Stop reason: SDUPTHER

## 2020-01-21 RX ORDER — LIOTHYRONINE SODIUM 5 UG/1
5 TABLET ORAL 2 TIMES DAILY
Qty: 180 TAB | Refills: 2 | Status: SHIPPED | OUTPATIENT
Start: 2020-01-21 | End: 2020-10-29

## 2020-01-22 NOTE — PATIENT INSTRUCTIONS
Take your thyroid medication daily as discussed at visit.  Thyroid hormone should be taken first in the morning 30 minutes before eating.   Wait and take any iron supplements or multivitamins containing iron at least 6 hours after levothyroxine dose.  Notify me for symptoms of hypothyroidism including fatigue, cold intolerance, unexplained weight gain, constipation, depression, or dry skin.  Notify me for symptoms of hyperthyroidism including heat intolerance, fatigue, weight loss, heart racing, palpitations, anxiety, or tremor.  Please have your labs drawn prior to your next visit so that we may discuss them at the time of your next visit.

## 2020-01-22 NOTE — PROGRESS NOTES
Chief Complaint: Follow up for Primary Hypothyroidism secondary to Hashimoto's thyroiditis and history of vitamin D deficiency and thyroid nodules, she also has prediabetes.    HPI:     Mary Hull is a 49 y.o. female here for follow up of the above medical issues.   She is a patient of CLIFFORD Hwang and this is my first time seeing her today.  She has a history of primary hypothyroidism secondary to Hashimoto's thyroiditis with no baseline TPO antibodies but with prominent thyroid ultrasound from the past showing findings compatible with Hashimoto's thyroiditis.      Currently she is on combination therapy with Synthroid brand name 112 MCG daily with Cytomel 5 MCG twice a day.  She is overdue for labs.  She reports good compliance with her thyroid medication and denies missing any daily doses.  She denies taking any iron, calcium supplements or antacids.  TSH was normal at 0.65 with normal free T4 and free T3 levels on July 18, 2019.  TSH levels have not been repeated.      She has vitamin D deficiency at baseline and vitamin D levels have improved to 32 on March 8, 2019 and she is taking ergocalciferol 50,000 units weekly.  She is also overdue for labs.  She denies having any falls, fractures, or bone pain      Finally she has a history of a 1 cm hyperechoic benign solid nodule on the right lower lobe in the setting of a heterogenous thyroid gland.  This nodule appears low risk for malignancy per my read based on the ultrasound from July 22, 2019.  It is stable when compared to her previous ultrasound from July 2018.  She denies a family history of thyroid cancer and denies anterior neck compressive symptoms.       Aside from primary hypothyroidism and the above issues she also has a history of prediabetes with a baseline A1c of 6.0% and is on metformin low-dose 500 mg twice a day.  She is tolerating metformin well denies diarrhea.  She is also overdue for labs.      Patient's medications,  "allergies, and social histories were reviewed and updated as appropriate.      ROS:     CONS:     No fever, no chills   EYES:     No diplopia, no blurry vision   CV:           No chest pain, no palpitations   PULM:     No SOB, no cough, no hemoptysis.   GI:            No nausea, no vomiting, no diarrhea, no constipation   ENDO:     No polyuria, no polydipsia, no heat intolerance, no cold intolerance       Past Medical History:  Problem List:  2019-04: Pre-diabetes  2019-03: Vitamin D deficiency  2017-08: Hypothyroidism due to Hashimoto's thyroiditis  2017-08: Thyroid nodule  2017-08: Weight gain      Past Surgical History:  Past Surgical History:   Procedure Laterality Date   • VAGINAL HYSTERECTOMY SCOPE TOTAL  8/22/08    Performed by KATIA PANDEY at SURGERY SAME DAY [facility]   • CYSTOSCOPY  8/22/08    Performed by KATIA PANDEY at SURGERY SAME DAY [facility]   • US-NEEDLE CORE BX-BREAST PANEL          Allergies:  Sulfa drugs; Tetracycline; Latex; and Tape     Social History:  Social History     Tobacco Use   • Smoking status: Never Smoker   • Smokeless tobacco: Never Used   Substance Use Topics   • Alcohol use: Yes   • Drug use: No        Family History:   family history includes Cancer in her mother; Diabetes in her father; Heart Disease in her father and mother; Kidney Disease in her father; Thyroid in her sister.      PHYSICAL EXAM:   Vital signs: /68 (BP Location: Left arm, Patient Position: Sitting)   Pulse 90   Ht 1.778 m (5' 10\")   Wt 111.6 kg (246 lb)   SpO2 96%   BMI 35.30 kg/m²   GENERAL: Well-developed, well-nourished in no apparent distress.   EYE:  No ocular asymmetry, PERRLA  HENT: Pink, moist mucous membranes.    NECK: Thyroid is slightly enlarged and feels bosselated  CARDIOVASCULAR:  No murmurs  LUNGS: Clear breath sounds  ABDOMEN: Soft, nontender   EXTREMITIES: No clubbing, cyanosis, or edema.   NEUROLOGICAL: No gross focal motor abnormalities   LYMPH: No cervical " adenopathy palpated.   SKIN: No rashes, lesions.       Labs:  Lab Results   Component Value Date/Time    SODIUM 138 07/18/2019 09:37 AM    SODIUM 137 08/10/2016 03:22 PM    POTASSIUM 4.4 07/18/2019 09:37 AM    POTASSIUM 4.0 08/10/2016 03:22 PM    CHLORIDE 102 07/18/2019 09:37 AM    CHLORIDE 102 08/10/2016 03:22 PM    CO2 22 07/18/2019 09:37 AM    CO2 29 08/10/2016 03:22 PM    ANION 6.0 08/10/2016 03:22 PM    GLUCOSE 95 07/18/2019 09:37 AM    GLUCOSE 101 (H) 08/10/2016 03:22 PM    BUN 17 07/18/2019 09:37 AM    BUN 13 08/10/2016 03:22 PM    CREATININE 1.15 (H) 07/18/2019 09:37 AM    CREATININE 1.21 08/10/2016 03:22 PM    CREATININE 1.1 08/19/2008 11:59 AM    CALCIUM 9.3 07/18/2019 09:37 AM    CALCIUM 9.3 08/10/2016 03:22 PM    ASTSGOT 24 07/18/2019 09:37 AM    ASTSGOT 30 08/10/2016 03:22 PM    ALTSGPT 24 07/18/2019 09:37 AM    ALTSGPT 30 08/10/2016 03:22 PM    TBILIRUBIN 0.4 07/18/2019 09:37 AM    TBILIRUBIN 0.8 08/10/2016 03:22 PM    ALBUMIN 4.5 07/18/2019 09:37 AM    ALBUMIN 4.1 08/10/2016 03:22 PM    TOTPROTEIN 6.7 07/18/2019 09:37 AM    TOTPROTEIN 7.0 08/10/2016 03:22 PM    GLOBULIN 2.2 07/18/2019 09:37 AM    GLOBULIN 2.9 08/10/2016 03:22 PM    AGRATIO 2.0 07/18/2019 09:37 AM    AGRATIO 1.4 08/10/2016 03:22 PM       Lab Results   Component Value Date/Time    SODIUM 138 07/18/2019 0937    POTASSIUM 4.4 07/18/2019 0937    CHLORIDE 102 07/18/2019 0937    CO2 22 07/18/2019 0937    GLUCOSE 95 07/18/2019 0937    BUN 17 07/18/2019 0937    CREATININE 1.15 (H) 07/18/2019 0937    CALCIUM 9.3 07/18/2019 0937    ANION 6.0 08/10/2016 1522       Lab Results   Component Value Date/Time    CHOLSTRLTOT 177 03/08/2019 1015    TRIGLYCERIDE 109 03/08/2019 1015    HDL 40 03/08/2019 1015     (H) 03/08/2019 1015       Lab Results   Component Value Date/Time    TSHULTRASEN 1.160 08/15/2017 0832     Lab Results   Component Value Date/Time    FREET4 0.88 08/15/2017 0832     No results found for: FREET3  No results found for:  THYSTIMIG    No results found for: MICROSOMALA      Imaging: Please refer to thyroid ultrasound from July 22, 2019      ASSESSMENT/PLAN:     1. Hypothyroidism due to Hashimoto's thyroiditis  Previously controlled  Clinically patient appears to be euthyroid  She is overdue for labs  She is on combination therapy with Synthroid 112 and Cytomel 5 twice a day  Recommend obtaining TSH free T4 and free T3 levels today  We will update patient on labs  We will plan for follow-up in 6 months with repeat of TSH free T4 and free T3 levels    2. Thyroid nodule  Stable  Low risk hyperechoic solid nodule measuring 1 cm on the right lower lobe  Recommend observation and repeating ultrasound again after 12 to 24 months    3. Pre-diabetes  Stable  She is overdue for labs  Recommend checking A1c today  Continue Metformin 500 mg twice a day will increase if A1c is elevated or unstable    4. Vitamin D deficiency  Stable  She is overdue for labs  Recommend checking calcium and vitamin D levels today  We will update patient  Will renew ergocalciferol 50,000 units weekly vitamin D levels are low      Return in about 3 months (around 4/21/2020).      Thank you kindly for allowing me to participate in the thyroid care plan for this patient.    Cordell Cardoza MD, FACE, [facility]  01/21/20    CC:   Crow DE LEON M.D.

## 2020-01-24 ENCOUNTER — TELEPHONE (OUTPATIENT)
Dept: ENDOCRINOLOGY | Facility: MEDICAL CENTER | Age: 50
End: 2020-01-24

## 2020-01-24 LAB
25(OH)D3+25(OH)D2 SERPL-MCNC: 36.3 NG/ML (ref 30–100)
ALBUMIN SERPL-MCNC: 4.1 G/DL (ref 3.8–4.8)
ALBUMIN/GLOB SERPL: 1.7 {RATIO} (ref 1.2–2.2)
ALP SERPL-CCNC: 54 IU/L (ref 39–117)
ALT SERPL-CCNC: 19 IU/L (ref 0–32)
AST SERPL-CCNC: 19 IU/L (ref 0–40)
BASOPHILS # BLD AUTO: 0 X10E3/UL (ref 0–0.2)
BASOPHILS NFR BLD AUTO: 0 %
BILIRUB SERPL-MCNC: 0.4 MG/DL (ref 0–1.2)
BUN SERPL-MCNC: 13 MG/DL (ref 6–24)
BUN/CREAT SERPL: 13 (ref 9–23)
CALCIUM SERPL-MCNC: 9.1 MG/DL (ref 8.7–10.2)
CHLORIDE SERPL-SCNC: 104 MMOL/L (ref 96–106)
CHOLEST SERPL-MCNC: 201 MG/DL (ref 100–199)
CO2 SERPL-SCNC: 22 MMOL/L (ref 20–29)
CREAT SERPL-MCNC: 1.04 MG/DL (ref 0.57–1)
ENDOMYSIUM IGA SER QL: NEGATIVE
EOSINOPHIL # BLD AUTO: 0.1 X10E3/UL (ref 0–0.4)
EOSINOPHIL NFR BLD AUTO: 1 %
ERYTHROCYTE [DISTWIDTH] IN BLOOD BY AUTOMATED COUNT: 14.2 % (ref 11.7–15.4)
GLIADIN PEPTIDE IGA SER-ACNC: 4 UNITS (ref 0–19)
GLIADIN PEPTIDE IGG SER-ACNC: 3 UNITS (ref 0–19)
GLOBULIN SER CALC-MCNC: 2.4 G/DL (ref 1.5–4.5)
GLUCOSE SERPL-MCNC: 98 MG/DL (ref 65–99)
HBA1C MFR BLD: 5.9 % (ref 4.8–5.6)
HCT VFR BLD AUTO: 41.5 % (ref 34–46.6)
HDLC SERPL-MCNC: 41 MG/DL
HGB BLD-MCNC: 14 G/DL (ref 11.1–15.9)
IGA SERPL-MCNC: 257 MG/DL (ref 87–352)
IMM GRANULOCYTES # BLD AUTO: 0 X10E3/UL (ref 0–0.1)
IMM GRANULOCYTES NFR BLD AUTO: 0 %
IMMATURE CELLS  115398: NORMAL
LABORATORY COMMENT REPORT: ABNORMAL
LDLC SERPL CALC-MCNC: 131 MG/DL (ref 0–99)
LYMPHOCYTES # BLD AUTO: 2.2 X10E3/UL (ref 0.7–3.1)
LYMPHOCYTES NFR BLD AUTO: 32 %
MCH RBC QN AUTO: 30.4 PG (ref 26.6–33)
MCHC RBC AUTO-ENTMCNC: 33.7 G/DL (ref 31.5–35.7)
MCV RBC AUTO: 90 FL (ref 79–97)
MONOCYTES # BLD AUTO: 0.3 X10E3/UL (ref 0.1–0.9)
MONOCYTES NFR BLD AUTO: 5 %
MORPHOLOGY BLD-IMP: NORMAL
NEUTROPHILS # BLD AUTO: 4.2 X10E3/UL (ref 1.4–7)
NEUTROPHILS NFR BLD AUTO: 62 %
NRBC BLD AUTO-RTO: NORMAL %
PLATELET # BLD AUTO: 247 X10E3/UL (ref 150–450)
POTASSIUM SERPL-SCNC: 4.5 MMOL/L (ref 3.5–5.2)
PROT SERPL-MCNC: 6.5 G/DL (ref 6–8.5)
RBC # BLD AUTO: 4.6 X10E6/UL (ref 3.77–5.28)
SODIUM SERPL-SCNC: 140 MMOL/L (ref 134–144)
T4 FREE SERPL-MCNC: 1.38 NG/DL (ref 0.82–1.77)
THYROPEROXIDASE AB SERPL-ACNC: 89 IU/ML (ref 0–34)
TRIGL SERPL-MCNC: 143 MG/DL (ref 0–149)
TSH SERPL DL<=0.005 MIU/L-ACNC: 0.74 UIU/ML (ref 0.45–4.5)
TTG IGA SER-ACNC: <2 U/ML (ref 0–3)
TTG IGG SER-ACNC: <2 U/ML (ref 0–5)
VLDLC SERPL CALC-MCNC: 29 MG/DL (ref 5–40)
WBC # BLD AUTO: 6.8 X10E3/UL (ref 3.4–10.8)

## 2020-01-24 NOTE — TELEPHONE ENCOUNTER
Phone Number Called: [phone]    Call outcome: left message for patient to call back regarding message below    Message: Contacted patient to ensure patient had time to review the [employer] message Dr. Cardoza sent them. If they had any questions to please contact us via phone or [employer] message.

## 2020-01-24 NOTE — TELEPHONE ENCOUNTER
----- Message from Cordell Cardoza M.D. sent at 1/24/2020 12:00 PM PST -----  To Ms. Hull    Good news your labs are well controlled    Thyroid levels are optimal continue the same dose of Synthroid and cytomel    Prediabetes is well controlled, a1c is down to 5.9% continue metformin    Cholesterol is good    Please keep your appt as scheduled and remember to do your labs before your visit so we can discuss them face to face    Thank you    Dr. Cardoza

## 2020-07-17 LAB
25(OH)D3+25(OH)D2 SERPL-MCNC: 42.6 NG/ML (ref 30–100)
ALBUMIN SERPL-MCNC: 4.5 G/DL (ref 3.8–4.9)
ALBUMIN/GLOB SERPL: 2 {RATIO} (ref 1.2–2.2)
ALP SERPL-CCNC: 59 IU/L (ref 39–117)
ALT SERPL-CCNC: 20 IU/L (ref 0–32)
AST SERPL-CCNC: 19 IU/L (ref 0–40)
BASOPHILS # BLD AUTO: 0.1 X10E3/UL (ref 0–0.2)
BASOPHILS NFR BLD AUTO: 1 %
BILIRUB SERPL-MCNC: 0.3 MG/DL (ref 0–1.2)
BUN SERPL-MCNC: 14 MG/DL (ref 6–24)
BUN/CREAT SERPL: 12 (ref 9–23)
CALCIUM SERPL-MCNC: 9.5 MG/DL (ref 8.7–10.2)
CHLORIDE SERPL-SCNC: 102 MMOL/L (ref 96–106)
CO2 SERPL-SCNC: 20 MMOL/L (ref 20–29)
CREAT SERPL-MCNC: 1.14 MG/DL (ref 0.57–1)
EOSINOPHIL # BLD AUTO: 0.1 X10E3/UL (ref 0–0.4)
EOSINOPHIL NFR BLD AUTO: 1 %
ERYTHROCYTE [DISTWIDTH] IN BLOOD BY AUTOMATED COUNT: 13.1 % (ref 11.7–15.4)
GLOBULIN SER CALC-MCNC: 2.3 G/DL (ref 1.5–4.5)
GLUCOSE SERPL-MCNC: 92 MG/DL (ref 65–99)
HBA1C MFR BLD: 5.7 % (ref 4.8–5.6)
HCT VFR BLD AUTO: 41.8 % (ref 34–46.6)
HGB BLD-MCNC: 13.7 G/DL (ref 11.1–15.9)
IMM GRANULOCYTES # BLD AUTO: 0 X10E3/UL (ref 0–0.1)
IMM GRANULOCYTES NFR BLD AUTO: 0 %
IMMATURE CELLS  115398: NORMAL
LYMPHOCYTES # BLD AUTO: 2.9 X10E3/UL (ref 0.7–3.1)
LYMPHOCYTES NFR BLD AUTO: 36 %
MCH RBC QN AUTO: 28.9 PG (ref 26.6–33)
MCHC RBC AUTO-ENTMCNC: 32.8 G/DL (ref 31.5–35.7)
MCV RBC AUTO: 88 FL (ref 79–97)
MONOCYTES # BLD AUTO: 0.3 X10E3/UL (ref 0.1–0.9)
MONOCYTES NFR BLD AUTO: 4 %
MORPHOLOGY BLD-IMP: NORMAL
NEUTROPHILS # BLD AUTO: 4.6 X10E3/UL (ref 1.4–7)
NEUTROPHILS NFR BLD AUTO: 58 %
NRBC BLD AUTO-RTO: NORMAL %
PLATELET # BLD AUTO: 251 X10E3/UL (ref 150–450)
POTASSIUM SERPL-SCNC: 4.6 MMOL/L (ref 3.5–5.2)
PROT SERPL-MCNC: 6.8 G/DL (ref 6–8.5)
RBC # BLD AUTO: 4.74 X10E6/UL (ref 3.77–5.28)
SODIUM SERPL-SCNC: 140 MMOL/L (ref 134–144)
T4 FREE SERPL-MCNC: 1.28 NG/DL (ref 0.82–1.77)
TSH SERPL DL<=0.005 MIU/L-ACNC: 0.43 UIU/ML (ref 0.45–4.5)
WBC # BLD AUTO: 8.1 X10E3/UL (ref 3.4–10.8)

## 2020-07-21 ENCOUNTER — OFFICE VISIT (OUTPATIENT)
Dept: ENDOCRINOLOGY | Facility: MEDICAL CENTER | Age: 50
End: 2020-07-21
Attending: INTERNAL MEDICINE
Payer: COMMERCIAL

## 2020-07-21 VITALS
HEIGHT: 70 IN | DIASTOLIC BLOOD PRESSURE: 72 MMHG | WEIGHT: 247.6 LBS | SYSTOLIC BLOOD PRESSURE: 118 MMHG | BODY MASS INDEX: 35.45 KG/M2 | HEART RATE: 68 BPM | OXYGEN SATURATION: 96 %

## 2020-07-21 DIAGNOSIS — E03.8 HYPOTHYROIDISM DUE TO HASHIMOTO'S THYROIDITIS: ICD-10-CM

## 2020-07-21 DIAGNOSIS — E04.1 THYROID NODULE: ICD-10-CM

## 2020-07-21 DIAGNOSIS — R73.03 PRE-DIABETES: ICD-10-CM

## 2020-07-21 DIAGNOSIS — E55.9 VITAMIN D DEFICIENCY: ICD-10-CM

## 2020-07-21 DIAGNOSIS — E06.3 HYPOTHYROIDISM DUE TO HASHIMOTO'S THYROIDITIS: ICD-10-CM

## 2020-07-21 PROCEDURE — 99214 OFFICE O/P EST MOD 30 MIN: CPT | Performed by: INTERNAL MEDICINE

## 2020-07-21 PROCEDURE — 99211 OFF/OP EST MAY X REQ PHY/QHP: CPT | Performed by: INTERNAL MEDICINE

## 2020-07-21 RX ORDER — METFORMIN HYDROCHLORIDE 500 MG/1
500 TABLET, EXTENDED RELEASE ORAL 2 TIMES DAILY
Qty: 180 TAB | Refills: 3 | Status: SHIPPED | OUTPATIENT
Start: 2020-07-21 | End: 2020-10-19

## 2020-07-21 ASSESSMENT — FIBROSIS 4 INDEX: FIB4 SCORE: 0.86

## 2020-07-21 NOTE — PROGRESS NOTES
Chief Complaint: Follow up for Primary Hypothyroidism secondary to Hashimoto's thyroiditis and history of vitamin D deficiency and thyroid nodules, she also has prediabetes.    HPI:     Mary Hull is a 49 y.o. female here for follow up of the above medical issues.   She is a patient of CLIFFORD Hwang and this is my first time seeing her today.  She has a history of primary hypothyroidism secondary to Hashimoto's thyroiditis with no baseline TPO antibodies but with prominent thyroid ultrasound from the past showing findings compatible with Hashimoto's thyroiditis.      Currently she is on combination therapy with Synthroid brand name 112 MCG daily with Cytomel 5 MCG twice a day.  She reports good compliance with her thyroid medication and denies missing any daily doses.  She denies taking any iron, calcium supplements or antacids.     Her most recent TSH is optimal at 0.428      She has vitamin D deficiency at baseline and vitamin D levels have improved to 42 on July 2020 and she is taking ergocalciferol 50,000 units weekly.  She denies having any falls, fractures, or bone pain      Finally she has a history of a 1 cm hyperechoic benign solid nodule on the right lower lobe in the setting of a heterogenous thyroid gland.  This nodule appears low risk for malignancy per my read based on the ultrasound from July 22, 2019.  It is stable when compared to her previous ultrasound from July 2018.  She denies a family history of thyroid cancer and denies anterior neck compressive symptoms.       Aside from primary hypothyroidism and the above issues she also has a history of prediabetes with a baseline A1c of 6.0% and is on metformin low-dose 500 mg twice a day.  She is tolerating metformin well denies diarrhea.  Her a1c has improved to 5.7%      Patient's medications, allergies, and social histories were reviewed and updated as appropriate.      ROS:     CONS:     No fever, no chills   EYES:     No diplopia,  "no blurry vision   CV:           No chest pain, no palpitations   PULM:     No SOB, no cough, no hemoptysis.   GI:            No nausea, no vomiting, no diarrhea, no constipation   ENDO:     No polyuria, no polydipsia, no heat intolerance, no cold intolerance       Past Medical History:  Problem List:  2019-04: Pre-diabetes  2019-03: Vitamin D deficiency  2017-08: Hypothyroidism due to Hashimoto's thyroiditis  2017-08: Thyroid nodule  2017-08: Weight gain      Past Surgical History:  Past Surgical History:   Procedure Laterality Date   • VAGINAL HYSTERECTOMY SCOPE TOTAL  8/22/08    Performed by KATIA PANDEY at [facility]   • CYSTOSCOPY  8/22/08    Performed by KATIA PANDEY at [facility]   • US-NEEDLE CORE BX-BREAST PANEL          Allergies:  Sulfa drugs; Tetracycline; Latex; and Tape     Social History:  Social History     Tobacco Use   • Smoking status: Never Smoker   • Smokeless tobacco: Never Used   Substance Use Topics   • Alcohol use: Yes   • Drug use: No        Family History:   family history includes Cancer in her mother; Diabetes in her father; Heart Disease in her father and mother; Kidney Disease in her father; Thyroid in her sister.      PHYSICAL EXAM:   Vital signs: /72 (BP Location: Left arm, Patient Position: Sitting, BP Cuff Size: Large adult)   Pulse 68   Ht 1.778 m (5' 10\")   Wt 112.3 kg (247 lb 9.6 oz)   SpO2 96%   BMI 35.53 kg/m²   GENERAL: Well-developed, well-nourished in no apparent distress.   EYE:  No ocular asymmetry, PERRLA  HENT: Pink, moist mucous membranes.    NECK: Thyroid is slightly enlarged and feels bosselated  CARDIOVASCULAR:  No murmurs  LUNGS: Clear breath sounds  ABDOMEN: Soft, nontender   EXTREMITIES: No clubbing, cyanosis, or edema.   NEUROLOGICAL: No gross focal motor abnormalities   LYMPH: No cervical adenopathy palpated.   SKIN: No rashes, lesions.       Labs:  Lab Results   Component Value Date/Time    SODIUM 140 " 07/16/2020 07:18 AM    SODIUM 137 08/10/2016 03:22 PM    POTASSIUM 4.6 07/16/2020 07:18 AM    POTASSIUM 4.0 08/10/2016 03:22 PM    CHLORIDE 102 07/16/2020 07:18 AM    CHLORIDE 102 08/10/2016 03:22 PM    CO2 20 07/16/2020 07:18 AM    CO2 29 08/10/2016 03:22 PM    ANION 6.0 08/10/2016 03:22 PM    GLUCOSE 92 07/16/2020 07:18 AM    GLUCOSE 101 (H) 08/10/2016 03:22 PM    BUN 14 07/16/2020 07:18 AM    BUN 13 08/10/2016 03:22 PM    CREATININE 1.14 (H) 07/16/2020 07:18 AM    CREATININE 1.21 08/10/2016 03:22 PM    CREATININE 1.1 08/19/2008 11:59 AM    CALCIUM 9.5 07/16/2020 07:18 AM    CALCIUM 9.3 08/10/2016 03:22 PM    ASTSGOT 19 07/16/2020 07:18 AM    ASTSGOT 30 08/10/2016 03:22 PM    ALTSGPT 20 07/16/2020 07:18 AM    ALTSGPT 30 08/10/2016 03:22 PM    TBILIRUBIN 0.3 07/16/2020 07:18 AM    TBILIRUBIN 0.8 08/10/2016 03:22 PM    ALBUMIN 4.5 07/16/2020 07:18 AM    ALBUMIN 4.1 08/10/2016 03:22 PM    TOTPROTEIN 6.8 07/16/2020 07:18 AM    TOTPROTEIN 7.0 08/10/2016 03:22 PM    GLOBULIN 2.3 07/16/2020 07:18 AM    GLOBULIN 2.9 08/10/2016 03:22 PM    AGRATIO 2.0 07/16/2020 07:18 AM    AGRATIO 1.4 08/10/2016 03:22 PM       Lab Results   Component Value Date/Time    SODIUM 138 07/18/2019 0937    POTASSIUM 4.4 07/18/2019 0937    CHLORIDE 102 07/18/2019 0937    CO2 22 07/18/2019 0937    GLUCOSE 95 07/18/2019 0937    BUN 17 07/18/2019 0937    CREATININE 1.15 (H) 07/18/2019 0937    CALCIUM 9.3 07/18/2019 0937    ANION 6.0 08/10/2016 1522       Lab Results   Component Value Date/Time    CHOLSTRLTOT 177 03/08/2019 1015    TRIGLYCERIDE 109 03/08/2019 1015    HDL 40 03/08/2019 1015     (H) 03/08/2019 1015       Lab Results   Component Value Date/Time    TSHULTRASEN 1.160 08/15/2017 0832     Lab Results   Component Value Date/Time    FREET4 0.88 08/15/2017 0832     No results found for: FREET3  No results found for: THYSTIMIG    No results found for: MICROSOMALA      Imaging: Please refer to thyroid ultrasound from July 22,  2019      ASSESSMENT/PLAN:     1. Hypothyroidism due to Hashimoto's thyroiditis  Controlled  Clinically patient appears to be euthyroid  Continue  Synthroid 112 and Cytomel 5 twice a day  We will plan for follow-up in 6 months with repeat of TSH free T4 and free T3 levels    2. Thyroid nodule  Stable  Low risk hyperechoic solid nodule measuring 1 cm on the right lower lobe  Recommend observation and repeating ultrasound again after 12 to 24 months    3. Pre-diabetes  Improved labs  Continue Metformin 500 mg twice a day   Repeat a1c in 6 months    4. Vitamin D deficiency  Stable  Continue ergocalciferol 50,000 units weekly   Repeat Ca and vitamin D levels in 6 months      Return in about 6 months (around 1/21/2021).      Thank you kindly for allowing me to participate in the thyroid care plan for this patient.    Cordell Cardoza MD, FACE, [facility]  01/21/20    CC:   Crow DE LEON M.D.

## 2020-09-03 DIAGNOSIS — E06.3 HYPOTHYROIDISM DUE TO HASHIMOTO'S THYROIDITIS: ICD-10-CM

## 2020-09-03 DIAGNOSIS — E03.8 HYPOTHYROIDISM DUE TO HASHIMOTO'S THYROIDITIS: ICD-10-CM

## 2020-09-04 RX ORDER — LEVOTHYROXINE SODIUM 112 MCG
TABLET ORAL
Qty: 90 TAB | Refills: 2 | Status: SHIPPED | OUTPATIENT
Start: 2020-09-04 | End: 2021-01-25 | Stop reason: SDUPTHER

## 2020-09-04 NOTE — TELEPHONE ENCOUNTER
Received request via: Pharmacy    Was the patient seen in the last year in this department? Yes    Does the patient have an active prescription (recently filled or refills available) for medication(s) requested? No       SYNTHROID 112 MCG Tab    Sig: TAKE 1 TABLET DAILY EVERY MORNING ON AN EMPTY STOMACH

## 2020-10-28 DIAGNOSIS — R73.03 PRE-DIABETES: ICD-10-CM

## 2020-10-28 DIAGNOSIS — E06.3 HYPOTHYROIDISM DUE TO HASHIMOTO'S THYROIDITIS: ICD-10-CM

## 2020-10-28 DIAGNOSIS — E03.8 HYPOTHYROIDISM DUE TO HASHIMOTO'S THYROIDITIS: ICD-10-CM

## 2020-10-28 RX ORDER — METFORMIN HYDROCHLORIDE 500 MG/1
500 TABLET, EXTENDED RELEASE ORAL 2 TIMES DAILY
Qty: 180 TAB | Refills: 3 | Status: SHIPPED | OUTPATIENT
Start: 2020-10-28 | End: 2021-01-25 | Stop reason: SDUPTHER

## 2020-10-28 NOTE — TELEPHONE ENCOUNTER
Received request via: Pharmacy    Was the patient seen in the last year in this department? Yes    Does the patient have an active prescription (recently filled or refills available) for medication(s) requested? No     liothyronine (CYTOMEL) 5 MCG Tab    Sig: TAKE 1 TABLET BY MOUTH TWICE A DAY

## 2020-10-29 RX ORDER — LIOTHYRONINE SODIUM 5 UG/1
TABLET ORAL
Qty: 180 TAB | Refills: 2 | Status: SHIPPED | OUTPATIENT
Start: 2020-10-29 | End: 2021-01-25 | Stop reason: SDUPTHER

## 2020-12-30 ENCOUNTER — HOSPITAL ENCOUNTER (OUTPATIENT)
Dept: RADIOLOGY | Facility: MEDICAL CENTER | Age: 50
End: 2020-12-30
Attending: FAMILY MEDICINE
Payer: COMMERCIAL

## 2020-12-30 DIAGNOSIS — Z12.31 VISIT FOR SCREENING MAMMOGRAM: ICD-10-CM

## 2020-12-30 PROCEDURE — 77067 SCR MAMMO BI INCL CAD: CPT

## 2021-01-21 LAB
25(OH)D3+25(OH)D2 SERPL-MCNC: 35 NG/ML (ref 30–100)
ALBUMIN SERPL-MCNC: 4.2 G/DL (ref 3.8–4.9)
ALBUMIN/GLOB SERPL: 1.7 {RATIO} (ref 1.2–2.2)
ALP SERPL-CCNC: 59 IU/L (ref 39–117)
ALT SERPL-CCNC: 19 IU/L (ref 0–32)
AST SERPL-CCNC: 18 IU/L (ref 0–40)
BILIRUB SERPL-MCNC: 0.3 MG/DL (ref 0–1.2)
BUN SERPL-MCNC: 14 MG/DL (ref 6–24)
BUN/CREAT SERPL: 14 (ref 9–23)
CALCIUM SERPL-MCNC: 9.1 MG/DL (ref 8.7–10.2)
CHLORIDE SERPL-SCNC: 102 MMOL/L (ref 96–106)
CHOLEST SERPL-MCNC: 175 MG/DL (ref 100–199)
CO2 SERPL-SCNC: 23 MMOL/L (ref 20–29)
CREAT SERPL-MCNC: 0.97 MG/DL (ref 0.57–1)
GLOBULIN SER CALC-MCNC: 2.5 G/DL (ref 1.5–4.5)
GLUCOSE SERPL-MCNC: 98 MG/DL (ref 65–99)
HBA1C MFR BLD: 6 % (ref 4.8–5.6)
HDLC SERPL-MCNC: 41 MG/DL
LABORATORY COMMENT REPORT: ABNORMAL
LDLC SERPL CALC-MCNC: 110 MG/DL (ref 0–99)
POTASSIUM SERPL-SCNC: 4.2 MMOL/L (ref 3.5–5.2)
PROT SERPL-MCNC: 6.7 G/DL (ref 6–8.5)
SODIUM SERPL-SCNC: 139 MMOL/L (ref 134–144)
T4 FREE SERPL-MCNC: 1.22 NG/DL (ref 0.82–1.77)
TRIGL SERPL-MCNC: 135 MG/DL (ref 0–149)
TSH SERPL DL<=0.005 MIU/L-ACNC: 0.24 UIU/ML (ref 0.45–4.5)
VLDLC SERPL CALC-MCNC: 24 MG/DL (ref 5–40)

## 2021-01-25 ENCOUNTER — TELEMEDICINE (OUTPATIENT)
Dept: ENDOCRINOLOGY | Facility: MEDICAL CENTER | Age: 51
End: 2021-01-25
Attending: INTERNAL MEDICINE
Payer: COMMERCIAL

## 2021-01-25 DIAGNOSIS — E55.9 VITAMIN D DEFICIENCY: ICD-10-CM

## 2021-01-25 DIAGNOSIS — R73.03 PRE-DIABETES: ICD-10-CM

## 2021-01-25 DIAGNOSIS — E04.1 THYROID NODULE: ICD-10-CM

## 2021-01-25 DIAGNOSIS — E03.8 HYPOTHYROIDISM DUE TO HASHIMOTO'S THYROIDITIS: ICD-10-CM

## 2021-01-25 DIAGNOSIS — E06.3 HYPOTHYROIDISM DUE TO HASHIMOTO'S THYROIDITIS: ICD-10-CM

## 2021-01-25 PROCEDURE — 99214 OFFICE O/P EST MOD 30 MIN: CPT | Mod: 95,CR | Performed by: INTERNAL MEDICINE

## 2021-01-25 RX ORDER — LEVOTHYROXINE SODIUM 112 MCG
TABLET ORAL
Qty: 90 TAB | Refills: 2 | Status: SHIPPED | OUTPATIENT
Start: 2021-01-25 | End: 2021-07-07

## 2021-01-25 RX ORDER — METFORMIN HYDROCHLORIDE 500 MG/1
1000 TABLET, EXTENDED RELEASE ORAL 2 TIMES DAILY
Qty: 360 TAB | Refills: 3 | Status: SHIPPED | OUTPATIENT
Start: 2021-01-25 | End: 2021-04-25

## 2021-01-25 RX ORDER — ERGOCALCIFEROL 1.25 MG/1
CAPSULE ORAL
Qty: 12 CAP | Refills: 5 | Status: SHIPPED | OUTPATIENT
Start: 2021-01-25 | End: 2021-07-27 | Stop reason: SDUPTHER

## 2021-01-25 RX ORDER — LIOTHYRONINE SODIUM 5 UG/1
TABLET ORAL
Qty: 180 TAB | Refills: 2 | Status: SHIPPED | OUTPATIENT
Start: 2021-01-25 | End: 2021-07-27 | Stop reason: SDUPTHER

## 2021-01-25 NOTE — PROGRESS NOTES
Chief Complaint: Follow up for Primary Hypothyroidism secondary to Hashimoto's thyroiditis and history of vitamin D deficiency and thyroid nodules, she also has prediabetes..  Patient was presented for a telehealth consultation via secure and encrypted videoconferencing technology. This encounter was conducted via Zoom . Verbal consent was obtained. Patient's identity was verified.    HPI:     Mary Hull is a 49 y.o. female here for follow up of the above medical issues.   She is a patient of CLIFFORD Hwang      Currently she is on combination therapy with Synthroid brand name 112 MCG daily with Cytomel 5 MCG twice a day.  She reports good compliance    Her most recent TSH is optimal at 0.244 with a free T4 of 1.22 on Jan 20, 2021    Her 25 hydroxyvitamin D levels dropped to 35 on Jan 2021 and she is taking ergocalciferol 50,000 units weekly.      Her a1c liz to 6.0% from 5.7% and she is on Metfromin 500mg bid      She reports he has been caregiver of her 66 y/o brother in law and 91 y/o father in law due to homeloss from a fire.         Finally she has a history of a 1 cm hyperechoic benign solid nodule on the right lower lobe in the setting of a heterogenous thyroid gland.  This nodule appears low risk for malignancy per my read based on the ultrasound from July 22, 2019.  It is stable when compared to her previous ultrasound from July 2018.  She denies a family history of thyroid cancer and denies anterior neck compressive symptoms.       Patient's medications, allergies, and social histories were reviewed and updated as appropriate.      ROS:     CONS:     No fever, no chills   EYES:     No diplopia, no blurry vision   CV:           No chest pain, no palpitations   PULM:     No SOB, no cough, no hemoptysis.   GI:            No nausea, no vomiting, no diarrhea, no constipation   ENDO:     No polyuria, no polydipsia, no heat intolerance, no cold intolerance       Past Medical History:  Problem  List:  2019-04: Pre-diabetes  2019-03: Vitamin D deficiency  2017-08: Hypothyroidism due to Hashimoto's thyroiditis  2017-08: Thyroid nodule  2017-08: Weight gain      Past Surgical History:  Past Surgical History:   Procedure Laterality Date   • VAGINAL HYSTERECTOMY SCOPE TOTAL  8/22/08    Performed by KATIA PANDEY at [facility]   • CYSTOSCOPY  8/22/08    Performed by KATIA PANDEY at [facility]   • US-NEEDLE CORE BX-BREAST PANEL          Allergies:  Sulfa drugs; Tetracycline; Latex; and Tape     Social History:  Social History     Tobacco Use   • Smoking status: Never Smoker   • Smokeless tobacco: Never Used   Substance Use Topics   • Alcohol use: Yes   • Drug use: No        Family History:   family history includes Cancer in her mother; Diabetes in her father; Heart Disease in her father and mother; Kidney Disease in her father; Thyroid in her sister.      PHYSICAL EXAM:   Vital signs: There were no vitals taken for this visit.  GENERAL: Well-developed, well-nourished in no apparent distress.   EYE:  No ocular asymmetry, PERRLA  HENT: Pink, moist mucous membranes.    NECK: Thyroid is slightly enlarged and feels bosselated  CARDIOVASCULAR:  No murmurs  LUNGS: Clear breath sounds  ABDOMEN: Soft, nontender   EXTREMITIES: No clubbing, cyanosis, or edema.   NEUROLOGICAL: No gross focal motor abnormalities   LYMPH: No cervical adenopathy seen   SKIN: No rashes, lesions.       Labs:  Lab Results   Component Value Date/Time    SODIUM 139 01/20/2021 05:25 AM    SODIUM 137 08/10/2016 03:22 PM    POTASSIUM 4.2 01/20/2021 05:25 AM    POTASSIUM 4.0 08/10/2016 03:22 PM    CHLORIDE 102 01/20/2021 05:25 AM    CHLORIDE 102 08/10/2016 03:22 PM    CO2 23 01/20/2021 05:25 AM    CO2 29 08/10/2016 03:22 PM    ANION 6.0 08/10/2016 03:22 PM    GLUCOSE 98 01/20/2021 05:25 AM    GLUCOSE 101 (H) 08/10/2016 03:22 PM    BUN 14 01/20/2021 05:25 AM    BUN 13 08/10/2016 03:22 PM    CREATININE 0.97  01/20/2021 05:25 AM    CREATININE 1.21 08/10/2016 03:22 PM    CREATININE 1.1 08/19/2008 11:59 AM    CALCIUM 9.1 01/20/2021 05:25 AM    CALCIUM 9.3 08/10/2016 03:22 PM    ASTSGOT 18 01/20/2021 05:25 AM    ASTSGOT 30 08/10/2016 03:22 PM    ALTSGPT 19 01/20/2021 05:25 AM    ALTSGPT 30 08/10/2016 03:22 PM    TBILIRUBIN 0.3 01/20/2021 05:25 AM    TBILIRUBIN 0.8 08/10/2016 03:22 PM    ALBUMIN 4.2 01/20/2021 05:25 AM    ALBUMIN 4.1 08/10/2016 03:22 PM    TOTPROTEIN 6.7 01/20/2021 05:25 AM    TOTPROTEIN 7.0 08/10/2016 03:22 PM    GLOBULIN 2.5 01/20/2021 05:25 AM    GLOBULIN 2.9 08/10/2016 03:22 PM    AGRATIO 1.7 01/20/2021 05:25 AM    AGRATIO 1.4 08/10/2016 03:22 PM       Lab Results   Component Value Date/Time    SODIUM 138 07/18/2019 0937    POTASSIUM 4.4 07/18/2019 0937    CHLORIDE 102 07/18/2019 0937    CO2 22 07/18/2019 0937    GLUCOSE 95 07/18/2019 0937    BUN 17 07/18/2019 0937    CREATININE 1.15 (H) 07/18/2019 0937    CALCIUM 9.3 07/18/2019 0937    ANION 6.0 08/10/2016 1522       Lab Results   Component Value Date/Time    CHOLSTRLTOT 177 03/08/2019 1015    TRIGLYCERIDE 109 03/08/2019 1015    HDL 40 03/08/2019 1015     (H) 03/08/2019 1015       Lab Results   Component Value Date/Time    TSHULTRASEN 1.160 08/15/2017 0832     Lab Results   Component Value Date/Time    FREET4 0.88 08/15/2017 0832     No results found for: FREET3  No results found for: THYSTIMIG    No results found for: MICROSOMALA      Imaging: Please refer to thyroid ultrasound from July 22, 2019      ASSESSMENT/PLAN:     1. Hypothyroidism due to Hashimoto's thyroiditis  Controlled, clinically patient is euthyroid  TSH is mildly low but this is still within tolerable limits  Continue  Synthroid 112 and Cytomel 5 twice a day  She gets her Synthroid from [facility]  We will plan for follow-up in 6 months with repeat of TSH free T4 and free T3 levels    2. Thyroid nodule  Stable  Low risk hyperechoic solid nodule measuring 1 cm on the right  lower lobe  Recommend observation and repeating ultrasound again after 12 to 24 months    3. Pre-diabetes  Unstable a1c liz to 6.0%  Increase Metformin 1000 mg twice a day   I am keeping her on extended release Metformin to avoid GI side effects  Repeat a1c in 6 months    4. Vitamin D deficiency  Controlled, vitamin D was 35  Continue ergocalciferol 50,000 units weekly   Repeat Ca and vitamin D levels in 6 months      Return in about 6 months (around 7/25/2021).      Thank you kindly for allowing me to participate in the thyroid care plan for this patient.    Cordell Cardoza MD, FACE, [facility]  01/21/20    CC:   Crow DE LEON M.D.

## 2021-07-07 DIAGNOSIS — E03.8 HYPOTHYROIDISM DUE TO HASHIMOTO'S THYROIDITIS: ICD-10-CM

## 2021-07-07 DIAGNOSIS — E06.3 HYPOTHYROIDISM DUE TO HASHIMOTO'S THYROIDITIS: ICD-10-CM

## 2021-07-07 RX ORDER — LEVOTHYROXINE SODIUM 112 MCG
TABLET ORAL
Qty: 90 TABLET | Refills: 2 | Status: SHIPPED | OUTPATIENT
Start: 2021-07-07 | End: 2022-06-23

## 2021-07-07 NOTE — TELEPHONE ENCOUNTER
Received request via: Pharmacy    Was the patient seen in the last year in this department? Yes    Does the patient have an active prescription (recently filled or refills available) for medication(s) requested? No     REQUESTED MEDICATION:   Requested Prescriptions     Pending Prescriptions Disp Refills   • SYNTHROID 112 MCG Tab [Pharmacy Med Name: SYNTHROID    TAB 112MCG] 90 tablet 2     Sig: TAKE 1 TABLET DAILY EVERY MORNING ON AN EMPTY STOMACH

## 2021-07-21 ENCOUNTER — PATIENT MESSAGE (OUTPATIENT)
Dept: ENDOCRINOLOGY | Facility: MEDICAL CENTER | Age: 51
End: 2021-07-21

## 2021-07-22 NOTE — TELEPHONE ENCOUNTER
From: Mary Hull  To: Physician Cordell Cardoza  Sent: 7/21/2021 7:48 PM PDT  Subject: Procedure Question    Happy July Dr. Cardoza,   I am looking forward to seeing you on 7/27/21. I misplaced my blood work lab sheet you gave me In (January ) to update labs before I see you.   [facility] is there a way to fax or email me the lab paperwork so I complete the labs before 7/27?   I hope this message finds you safe and well !   Thank you,   Mary

## 2021-07-24 LAB
25(OH)D3+25(OH)D2 SERPL-MCNC: 47.9 NG/ML (ref 30–100)
ALBUMIN SERPL-MCNC: 4.2 G/DL (ref 3.8–4.9)
ALBUMIN/GLOB SERPL: 1.8 {RATIO} (ref 1.2–2.2)
ALP SERPL-CCNC: 52 IU/L (ref 48–121)
ALT SERPL-CCNC: 16 IU/L (ref 0–32)
AST SERPL-CCNC: 19 IU/L (ref 0–40)
BILIRUB SERPL-MCNC: 0.4 MG/DL (ref 0–1.2)
BUN SERPL-MCNC: 14 MG/DL (ref 6–24)
BUN/CREAT SERPL: 15 (ref 9–23)
CALCIUM SERPL-MCNC: 9.6 MG/DL (ref 8.7–10.2)
CHLORIDE SERPL-SCNC: 104 MMOL/L (ref 96–106)
CO2 SERPL-SCNC: 21 MMOL/L (ref 20–29)
CREAT SERPL-MCNC: 0.94 MG/DL (ref 0.57–1)
GLOBULIN SER CALC-MCNC: 2.4 G/DL (ref 1.5–4.5)
GLUCOSE SERPL-MCNC: 89 MG/DL (ref 65–99)
HBA1C MFR BLD: 5.8 % (ref 4.8–5.6)
POTASSIUM SERPL-SCNC: 4.6 MMOL/L (ref 3.5–5.2)
PROT SERPL-MCNC: 6.6 G/DL (ref 6–8.5)
SODIUM SERPL-SCNC: 140 MMOL/L (ref 134–144)
T4 FREE SERPL-MCNC: 1.57 NG/DL (ref 0.82–1.77)
TSH SERPL DL<=0.005 MIU/L-ACNC: 0.15 UIU/ML (ref 0.45–4.5)

## 2021-07-27 ENCOUNTER — OFFICE VISIT (OUTPATIENT)
Dept: ENDOCRINOLOGY | Facility: MEDICAL CENTER | Age: 51
End: 2021-07-27
Attending: INTERNAL MEDICINE
Payer: COMMERCIAL

## 2021-07-27 VITALS
HEART RATE: 86 BPM | WEIGHT: 230 LBS | OXYGEN SATURATION: 96 % | HEIGHT: 70 IN | DIASTOLIC BLOOD PRESSURE: 78 MMHG | BODY MASS INDEX: 32.93 KG/M2 | SYSTOLIC BLOOD PRESSURE: 124 MMHG

## 2021-07-27 DIAGNOSIS — E03.8 HYPOTHYROIDISM DUE TO HASHIMOTO'S THYROIDITIS: ICD-10-CM

## 2021-07-27 DIAGNOSIS — E04.1 THYROID NODULE: ICD-10-CM

## 2021-07-27 DIAGNOSIS — E06.3 HYPOTHYROIDISM DUE TO HASHIMOTO'S THYROIDITIS: ICD-10-CM

## 2021-07-27 DIAGNOSIS — R73.03 PRE-DIABETES: ICD-10-CM

## 2021-07-27 DIAGNOSIS — E55.9 VITAMIN D DEFICIENCY: ICD-10-CM

## 2021-07-27 PROCEDURE — 99214 OFFICE O/P EST MOD 30 MIN: CPT | Performed by: INTERNAL MEDICINE

## 2021-07-27 PROCEDURE — 99211 OFF/OP EST MAY X REQ PHY/QHP: CPT | Performed by: INTERNAL MEDICINE

## 2021-07-27 RX ORDER — METFORMIN HYDROCHLORIDE 500 MG/1
1000 TABLET, EXTENDED RELEASE ORAL 2 TIMES DAILY
Qty: 360 TABLET | Refills: 3 | Status: SHIPPED | OUTPATIENT
Start: 2021-07-27 | End: 2022-01-17

## 2021-07-27 RX ORDER — ERGOCALCIFEROL 1.25 MG/1
CAPSULE ORAL
Qty: 12 CAPSULE | Refills: 5 | Status: SHIPPED | OUTPATIENT
Start: 2021-07-27 | End: 2022-01-25 | Stop reason: SDUPTHER

## 2021-07-27 RX ORDER — METFORMIN HYDROCHLORIDE 500 MG/1
TABLET, EXTENDED RELEASE ORAL
COMMUNITY
Start: 2021-07-14 | End: 2021-07-27 | Stop reason: SDUPTHER

## 2021-07-27 RX ORDER — LIOTHYRONINE SODIUM 5 UG/1
TABLET ORAL
Qty: 180 TABLET | Refills: 2 | Status: SHIPPED | OUTPATIENT
Start: 2021-07-27 | End: 2022-07-06

## 2021-07-27 ASSESSMENT — FIBROSIS 4 INDEX: FIB4 SCORE: 0.98

## 2021-07-27 ASSESSMENT — PATIENT HEALTH QUESTIONNAIRE - PHQ9: CLINICAL INTERPRETATION OF PHQ2 SCORE: 0

## 2021-07-27 NOTE — PROGRESS NOTES
Chief Complaint: Follow up for Primary Hypothyroidism secondary to Hashimoto's thyroiditis and history of vitamin D deficiency and thyroid nodules, she also has prediabetes.    HPI:     Mary Hull is a 49 y.o. female here for follow up of the above medical issues.   She was a patient of CLIFFORD Hwang  She works as a speech pathologist      Currently she is on combination therapy with Synthroid brand name 112 MCG daily with Cytomel 5 MCG twice a day.  She reports good compliance  She reports good energy levels and feels great  Her most recent TSH is 0.150 with a free T4 of 1.57 on July 23, 2021      Her 25 hydroxyvitamin D levels have improved to 47.9 while on weekly ergocalciferol 50,000 weekly    Her A1c improved from 6% to 5.8% while on Metformin 1000 mg twice a day      Finally she has a history of a 1 cm hyperechoic benign solid nodule on the right lower lobe in the setting of a heterogenous thyroid gland.  This nodule appears low risk for malignancy per my read based on the ultrasound from July 22, 2019.  It is stable when compared to her previous ultrasound from July 2018.  She denies a family history of thyroid cancer and denies anterior neck compressive symptoms.    We discussed scheduling a thyroid ultrasound in the office       Patient's medications, allergies, and social histories were reviewed and updated as appropriate.      ROS:     CONS:     No fever, no chills   EYES:     No diplopia, no blurry vision   CV:           No chest pain, no palpitations   PULM:     No SOB, no cough, no hemoptysis.   GI:            No nausea, no vomiting, no diarrhea, no constipation   ENDO:     No polyuria, no polydipsia, no heat intolerance, no cold intolerance       Past Medical History:  Problem List:  2019-04: Pre-diabetes  2019-03: Vitamin D deficiency  2017-08: Hypothyroidism due to Hashimoto's thyroiditis  2017-08: Thyroid nodule  2017-08: Weight gain      Past Surgical History:  Past Surgical  "History:   Procedure Laterality Date   • VAGINAL HYSTERECTOMY SCOPE TOTAL  8/22/08    Performed by KATIA PANDEY at [facility]   • CYSTOSCOPY  8/22/08    Performed by KATIA PANDEY at [facility]   • US-NEEDLE CORE BX-BREAST PANEL          Allergies:  Sulfa drugs; Tetracycline; Latex; and Tape     Social History:  Social History     Tobacco Use   • Smoking status: Never Smoker   • Smokeless tobacco: Never Used   Vaping Use   • Vaping Use: Never used   Substance Use Topics   • Alcohol use: Yes   • Drug use: No        Family History:   family history includes Cancer in her mother; Diabetes in her father; Heart Disease in her father and mother; Kidney Disease in her father; Thyroid in her sister.      PHYSICAL EXAM:   Vital signs: /78 (BP Location: Left arm, Patient Position: Sitting, BP Cuff Size: Large adult)   Pulse 86   Ht 1.778 m (5' 10\")   Wt 104 kg (230 lb)   SpO2 96%   BMI 33.00 kg/m²   GENERAL: Well-developed, well-nourished in no apparent distress.   EYE:  No ocular asymmetry, PERRLA  HENT: Pink, moist mucous membranes.    NECK: Thyroid feels slightly less enlarged  CARDIOVASCULAR:  No murmurs  LUNGS: Clear breath sounds  ABDOMEN: Soft, nontender   EXTREMITIES: No clubbing, cyanosis, or edema.   NEUROLOGICAL: No gross focal motor abnormalities   LYMPH: No cervical adenopathy seen   SKIN: No rashes, lesions.       Labs:  Lab Results   Component Value Date/Time    SODIUM 140 07/23/2021 07:41 AM    SODIUM 137 08/10/2016 03:22 PM    POTASSIUM 4.6 07/23/2021 07:41 AM    POTASSIUM 4.0 08/10/2016 03:22 PM    CHLORIDE 104 07/23/2021 07:41 AM    CHLORIDE 102 08/10/2016 03:22 PM    CO2 21 07/23/2021 07:41 AM    CO2 29 08/10/2016 03:22 PM    ANION 6.0 08/10/2016 03:22 PM    GLUCOSE 89 07/23/2021 07:41 AM    GLUCOSE 101 (H) 08/10/2016 03:22 PM    BUN 14 07/23/2021 07:41 AM    BUN 13 08/10/2016 03:22 PM    CREATININE 0.94 07/23/2021 07:41 AM    CREATININE 1.21 " 08/10/2016 03:22 PM    CREATININE 1.1 08/19/2008 11:59 AM    CALCIUM 9.6 07/23/2021 07:41 AM    CALCIUM 9.3 08/10/2016 03:22 PM    ASTSGOT 19 07/23/2021 07:41 AM    ASTSGOT 30 08/10/2016 03:22 PM    ALTSGPT 16 07/23/2021 07:41 AM    ALTSGPT 30 08/10/2016 03:22 PM    TBILIRUBIN 0.4 07/23/2021 07:41 AM    TBILIRUBIN 0.8 08/10/2016 03:22 PM    ALBUMIN 4.2 07/23/2021 07:41 AM    ALBUMIN 4.1 08/10/2016 03:22 PM    TOTPROTEIN 6.6 07/23/2021 07:41 AM    TOTPROTEIN 7.0 08/10/2016 03:22 PM    GLOBULIN 2.4 07/23/2021 07:41 AM    GLOBULIN 2.9 08/10/2016 03:22 PM    AGRATIO 1.8 07/23/2021 07:41 AM    AGRATIO 1.4 08/10/2016 03:22 PM       Lab Results   Component Value Date/Time    SODIUM 138 07/18/2019 0937    POTASSIUM 4.4 07/18/2019 0937    CHLORIDE 102 07/18/2019 0937    CO2 22 07/18/2019 0937    GLUCOSE 95 07/18/2019 0937    BUN 17 07/18/2019 0937    CREATININE 1.15 (H) 07/18/2019 0937    CALCIUM 9.3 07/18/2019 0937    ANION 6.0 08/10/2016 1522       Lab Results   Component Value Date/Time    CHOLSTRLTOT 177 03/08/2019 1015    TRIGLYCERIDE 109 03/08/2019 1015    HDL 40 03/08/2019 1015     (H) 03/08/2019 1015       Lab Results   Component Value Date/Time    TSHULTRASEN 1.160 08/15/2017 0832     Lab Results   Component Value Date/Time    FREET4 0.88 08/15/2017 0832     No results found for: FREET3  No results found for: THYSTIMIG    No results found for: MICROSOMALA      Imaging: Please refer to thyroid ultrasound from July 22, 2019      ASSESSMENT/PLAN:     1. Hypothyroidism due to Hashimoto's thyroiditis  Controlled, clinically patient is euthyroid  TSH is mildly low but this is still within tolerable limits  Continue  Synthroid 112 and Cytomel 5 twice a day  I refilled her meds  I will see her again in 6 months repeat thyroid labs    2. Thyroid nodule  Stable  Low risk hyperechoic solid nodule measuring 1 cm on the right lower lobe  Recommend observation  I am scheduling her for a thyroid ultrasound in the  office    3. Pre-diabetes  Controlled  A1c is down to 5.8%  Continue Metformin extended release 1000 mg twice a day  We will repeat her A1c as a point-of-care test in the office in 6 months    4. Vitamin D deficiency  Controlled  Vitamin D has improved to 48  Continue ergocalciferol 50,000 units weekly   Repeat calcium and 25-hydroxy vitamin levels in 6 months      Return in about 6 months (around 1/27/2022).      Thank you kindly for allowing me to participate in the thyroid care plan for this patient.    Cordell Cardoza MD, FACE, [facility]  01/21/20    CC:   Crow DE LEON M.D.

## 2021-12-29 ENCOUNTER — PATIENT MESSAGE (OUTPATIENT)
Dept: ENDOCRINOLOGY | Facility: MEDICAL CENTER | Age: 51
End: 2021-12-29

## 2021-12-30 DIAGNOSIS — E04.1 THYROID NODULE: ICD-10-CM

## 2022-01-06 ENCOUNTER — HOSPITAL ENCOUNTER (OUTPATIENT)
Dept: RADIOLOGY | Facility: MEDICAL CENTER | Age: 52
End: 2022-01-06
Attending: FAMILY MEDICINE
Payer: COMMERCIAL

## 2022-01-06 DIAGNOSIS — Z12.31 VISIT FOR SCREENING MAMMOGRAM: ICD-10-CM

## 2022-01-06 PROCEDURE — 77063 BREAST TOMOSYNTHESIS BI: CPT

## 2022-01-10 ENCOUNTER — TELEPHONE (OUTPATIENT)
Dept: ENDOCRINOLOGY | Facility: MEDICAL CENTER | Age: 52
End: 2022-01-10

## 2022-01-10 NOTE — PATIENT COMMUNICATION
LVM asking PT to call back to schedule 20min US appt    Patient presents with a possible DTPI to her right upper lip from the ET-tube urbano. At this time foam dressing is in place to help offload area. Talked with RN and recommended urbano be changed and repositioned. Will order Venelex ointment BID for topical care. Will have Enedelia CARABALLO follow-up tomorrow for reassessment and wound staging. Will continue to follow for wound reassessment.     Thanks

## 2022-01-11 ENCOUNTER — TELEPHONE (OUTPATIENT)
Dept: ENDOCRINOLOGY | Facility: MEDICAL CENTER | Age: 52
End: 2022-01-11

## 2022-01-11 DIAGNOSIS — E04.1 THYROID NODULE: ICD-10-CM

## 2022-01-11 DIAGNOSIS — E03.8 HYPOTHYROIDISM DUE TO HASHIMOTO'S THYROIDITIS: ICD-10-CM

## 2022-01-11 DIAGNOSIS — E06.3 HYPOTHYROIDISM DUE TO HASHIMOTO'S THYROIDITIS: ICD-10-CM

## 2022-01-16 DIAGNOSIS — R73.03 PRE-DIABETES: ICD-10-CM

## 2022-01-17 RX ORDER — METFORMIN HYDROCHLORIDE 500 MG/1
TABLET, EXTENDED RELEASE ORAL
Qty: 180 TABLET | Refills: 3 | Status: SHIPPED | OUTPATIENT
Start: 2022-01-17 | End: 2022-04-08 | Stop reason: SDUPTHER

## 2022-01-21 LAB
25(OH)D3+25(OH)D2 SERPL-MCNC: 50.4 NG/ML (ref 30–100)
ALBUMIN SERPL-MCNC: 4.2 G/DL (ref 3.8–4.9)
ALBUMIN/GLOB SERPL: 1.7 {RATIO} (ref 1.2–2.2)
ALP SERPL-CCNC: 53 IU/L (ref 44–121)
ALT SERPL-CCNC: 19 IU/L (ref 0–32)
AST SERPL-CCNC: 18 IU/L (ref 0–40)
BILIRUB SERPL-MCNC: 0.3 MG/DL (ref 0–1.2)
BUN SERPL-MCNC: 16 MG/DL (ref 6–24)
BUN/CREAT SERPL: 18 (ref 9–23)
CALCIUM SERPL-MCNC: 9.3 MG/DL (ref 8.7–10.2)
CHLORIDE SERPL-SCNC: 103 MMOL/L (ref 96–106)
CO2 SERPL-SCNC: 24 MMOL/L (ref 20–29)
CREAT SERPL-MCNC: 0.88 MG/DL (ref 0.57–1)
GLOBULIN SER CALC-MCNC: 2.5 G/DL (ref 1.5–4.5)
GLUCOSE SERPL-MCNC: 97 MG/DL (ref 65–99)
HBA1C MFR BLD: 5.8 % (ref 4.8–5.6)
POTASSIUM SERPL-SCNC: 4.3 MMOL/L (ref 3.5–5.2)
PROT SERPL-MCNC: 6.7 G/DL (ref 6–8.5)
SODIUM SERPL-SCNC: 141 MMOL/L (ref 134–144)
T4 FREE SERPL-MCNC: 1.59 NG/DL (ref 0.82–1.77)
TSH SERPL-ACNC: 0.08 UIU/ML (ref 0.45–4.5)

## 2022-01-24 ENCOUNTER — PROCEDURE VISIT (OUTPATIENT)
Dept: ENDOCRINOLOGY | Facility: MEDICAL CENTER | Age: 52
End: 2022-01-24
Attending: INTERNAL MEDICINE
Payer: COMMERCIAL

## 2022-01-24 DIAGNOSIS — E03.8 HYPOTHYROIDISM DUE TO HASHIMOTO'S THYROIDITIS: ICD-10-CM

## 2022-01-24 DIAGNOSIS — E04.1 THYROID NODULE: ICD-10-CM

## 2022-01-24 DIAGNOSIS — E06.3 HYPOTHYROIDISM DUE TO HASHIMOTO'S THYROIDITIS: ICD-10-CM

## 2022-01-24 PROCEDURE — 76536 US EXAM OF HEAD AND NECK: CPT | Performed by: INTERNAL MEDICINE

## 2022-01-25 ENCOUNTER — OFFICE VISIT (OUTPATIENT)
Dept: ENDOCRINOLOGY | Facility: MEDICAL CENTER | Age: 52
End: 2022-01-25
Attending: INTERNAL MEDICINE
Payer: COMMERCIAL

## 2022-01-25 VITALS
HEIGHT: 70 IN | OXYGEN SATURATION: 96 % | SYSTOLIC BLOOD PRESSURE: 128 MMHG | BODY MASS INDEX: 31.41 KG/M2 | HEART RATE: 86 BPM | WEIGHT: 219.4 LBS | DIASTOLIC BLOOD PRESSURE: 74 MMHG

## 2022-01-25 DIAGNOSIS — E06.3 HYPOTHYROIDISM DUE TO HASHIMOTO'S THYROIDITIS: ICD-10-CM

## 2022-01-25 DIAGNOSIS — E03.8 HYPOTHYROIDISM DUE TO HASHIMOTO'S THYROIDITIS: ICD-10-CM

## 2022-01-25 DIAGNOSIS — R73.03 PRE-DIABETES: ICD-10-CM

## 2022-01-25 DIAGNOSIS — E55.9 VITAMIN D DEFICIENCY: ICD-10-CM

## 2022-01-25 DIAGNOSIS — L65.9 HAIR LOSS: ICD-10-CM

## 2022-01-25 DIAGNOSIS — E04.1 THYROID NODULE: ICD-10-CM

## 2022-01-25 PROCEDURE — 99211 OFF/OP EST MAY X REQ PHY/QHP: CPT | Performed by: INTERNAL MEDICINE

## 2022-01-25 PROCEDURE — 99214 OFFICE O/P EST MOD 30 MIN: CPT | Performed by: INTERNAL MEDICINE

## 2022-01-25 RX ORDER — ERGOCALCIFEROL 1.25 MG/1
CAPSULE ORAL
Qty: 12 CAPSULE | Refills: 5 | Status: SHIPPED | OUTPATIENT
Start: 2022-01-25 | End: 2022-07-19 | Stop reason: SDUPTHER

## 2022-01-25 ASSESSMENT — FIBROSIS 4 INDEX: FIB4 SCORE: 0.86

## 2022-01-25 ASSESSMENT — PATIENT HEALTH QUESTIONNAIRE - PHQ9: CLINICAL INTERPRETATION OF PHQ2 SCORE: 0

## 2022-01-25 NOTE — PROGRESS NOTES
Chief Complaint: Follow up for Primary Hypothyroidism secondary to Hashimoto's thyroiditis and history of vitamin D deficiency and thyroid nodules, she also has prediabetes.    HPI:     Mary Hull is a 49 y.o. female here for follow up of the above medical issues.   She was a patient of CLIFFORD Hwang  She works as a speech pathologist      Currently she is on combination therapy with Synthroid brand name 112 MCG daily with Cytomel 5 MCG twice a day.  She reports good compliance  She reports good energy levels and feels great  He denies constipation and cold intolerance  She is complaining of hair loss and brittle nails    TSH is 0.078 with a free T4 of 1.59 on January 2022  I ordered a free T3 level but this was not drawn by the lab for unclear reasons    Her TSH was 0.150 with a free T4 of 1.57 on July 23, 2021      She has prediabetes that is on Metformin at 1000 mg daily  A1c is stable at 5.8%      She is on ergocalciferol 50,000 units weekly for vitamin D deficiency  Vitamin D is better at 50 with a calcium of 9.3 and serum creatinine of 0.88 on January 2022        Finally she has a history of a 1 cm hyperechoic benign solid nodule on the right lower lobe in the setting of a heterogenous thyroid gland.  This nodule appears low risk for malignancy per my read based on the ultrasound from July 22, 2019.  It is stable when compared to her previous ultrasound from July 2018.  She denies a family history of thyroid cancer and denies anterior neck compressive symptoms.    Repeat thyroid ultrasound on January 24, 2022 showed a stable less than 1 cm isoechoic solid nodule on the right lower lobe with smooth margins and no echogenic foci    I recommended observation of this nodule and repeating her ultrasound again in 12 to 24 months        Patient's medications, allergies, and social histories were reviewed and updated as appropriate.      ROS:     CONS:     No fever, no chills   EYES:     No diplopia,  "no blurry vision   CV:           No chest pain, no palpitations   PULM:     No SOB, no cough, no hemoptysis.   GI:            No nausea, no vomiting, no diarrhea, no constipation   ENDO:     No polyuria, no polydipsia, no heat intolerance, no cold intolerance       Past Medical History:  Problem List:  2019-04: Pre-diabetes  2019-03: Vitamin D deficiency  2017-08: Hypothyroidism due to Hashimoto's thyroiditis  2017-08: Thyroid nodule  2017-08: Weight gain      Past Surgical History:  Past Surgical History:   Procedure Laterality Date   • VAGINAL HYSTERECTOMY SCOPE TOTAL  8/22/08    Performed by KTAIA PANDEY at [facility]   • CYSTOSCOPY  8/22/08    Performed by KATIA PANDEY at [facility]   • US-NEEDLE CORE BX-BREAST PANEL          Allergies:  Sulfa drugs; Tetracycline; Latex; and Tape     Social History:  Social History     Tobacco Use   • Smoking status: Never Smoker   • Smokeless tobacco: Never Used   Vaping Use   • Vaping Use: Never used   Substance Use Topics   • Alcohol use: Not Currently   • Drug use: No        Family History:   family history includes Cancer in her mother; Diabetes in her father; Heart Disease in her father and mother; Kidney Disease in her father; Thyroid in her sister.      PHYSICAL EXAM:   Vital signs: /74 (BP Location: Left arm, Patient Position: Sitting, BP Cuff Size: Adult)   Pulse 86   Ht 1.778 m (5' 10\")   Wt 99.5 kg (219 lb 6.4 oz)   SpO2 96%   BMI 31.48 kg/m²   GENERAL: Well-developed, well-nourished in no apparent distress.   EYE:  No ocular asymmetry, PERRLA  HENT: Pink, moist mucous membranes.    NECK: Thyroid feels slightly less enlarged  CARDIOVASCULAR:  No murmurs  LUNGS: Clear breath sounds  ABDOMEN: Soft, nontender   EXTREMITIES: No clubbing, cyanosis, or edema.   NEUROLOGICAL: No gross focal motor abnormalities   LYMPH: No cervical adenopathy seen   SKIN: No rashes, lesions.       Labs:  Lab Results   Component Value " Date/Time    SODIUM 141 01/20/2022 05:16 AM    SODIUM 137 08/10/2016 03:22 PM    POTASSIUM 4.3 01/20/2022 05:16 AM    POTASSIUM 4.0 08/10/2016 03:22 PM    CHLORIDE 103 01/20/2022 05:16 AM    CHLORIDE 102 08/10/2016 03:22 PM    CO2 24 01/20/2022 05:16 AM    CO2 29 08/10/2016 03:22 PM    ANION 6.0 08/10/2016 03:22 PM    GLUCOSE 97 01/20/2022 05:16 AM    GLUCOSE 101 (H) 08/10/2016 03:22 PM    BUN 16 01/20/2022 05:16 AM    BUN 13 08/10/2016 03:22 PM    CREATININE 0.88 01/20/2022 05:16 AM    CREATININE 1.21 08/10/2016 03:22 PM    CREATININE 1.1 08/19/2008 11:59 AM    CALCIUM 9.3 01/20/2022 05:16 AM    CALCIUM 9.3 08/10/2016 03:22 PM    ASTSGOT 18 01/20/2022 05:16 AM    ASTSGOT 30 08/10/2016 03:22 PM    ALTSGPT 19 01/20/2022 05:16 AM    ALTSGPT 30 08/10/2016 03:22 PM    TBILIRUBIN 0.3 01/20/2022 05:16 AM    TBILIRUBIN 0.8 08/10/2016 03:22 PM    ALBUMIN 4.2 01/20/2022 05:16 AM    ALBUMIN 4.1 08/10/2016 03:22 PM    TOTPROTEIN 6.7 01/20/2022 05:16 AM    TOTPROTEIN 7.0 08/10/2016 03:22 PM    GLOBULIN 2.5 01/20/2022 05:16 AM    GLOBULIN 2.9 08/10/2016 03:22 PM    AGRATIO 1.7 01/20/2022 05:16 AM    AGRATIO 1.4 08/10/2016 03:22 PM       Lab Results   Component Value Date/Time    SODIUM 138 07/18/2019 0937    POTASSIUM 4.4 07/18/2019 0937    CHLORIDE 102 07/18/2019 0937    CO2 22 07/18/2019 0937    GLUCOSE 95 07/18/2019 0937    BUN 17 07/18/2019 0937    CREATININE 1.15 (H) 07/18/2019 0937    CALCIUM 9.3 07/18/2019 0937    ANION 6.0 08/10/2016 1522       Lab Results   Component Value Date/Time    CHOLSTRLTOT 177 03/08/2019 1015    TRIGLYCERIDE 109 03/08/2019 1015    HDL 40 03/08/2019 1015     (H) 03/08/2019 1015       Lab Results   Component Value Date/Time    TSHULTRASEN 1.160 08/15/2017 0832     Lab Results   Component Value Date/Time    FREET4 0.88 08/15/2017 0832     No results found for: FREET3  No results found for: THYSTIMIG    No results found for: MICROSOMALA      Imaging: Please refer to thyroid ultrasound from  July 22, 2019      ASSESSMENT/PLAN:     1. Hypothyroidism due to Hashimoto's thyroiditis  Controlled,   clinically patient is euthyroid  TSH is mildly low but this is still within tolerable limits  Continue  Synthroid 112 and Cytomel 5 twice a day  I want her to follow-up in 6 months with repeat of her TSH, free T4 and free T3 levels and because of hair loss I am checking her iron levels, ferritin and B12    2. Thyroid nodule  Stable  Low risk isoechoic to hyperechoic solid nodule measuring less than 1 cm on the right lower lobe  Recent ultrasound was completed  Findings were discussed and reviewed with the patient in detail  Recommend observation  We will repeat her ultrasound again in 12 to 24 months    3. Pre-diabetes  Controlled  A1c stable  Continue Metformin extended release 1000 mg twice a day  We will repeat her A1c as a point-of-care test in the office in 6 months    4. Vitamin D deficiency  Controlled  Continue ergocalciferol 50,000 units weekly   Repeat calcium and 25-hydroxy vitamin levels in 6 months      Return in about 6 months (around 7/25/2022).      Thank you kindly for allowing me to participate in the thyroid care plan for this patient.    Cordell Cardoza MD, [facility], [facility]  01/21/20    CC:   Crow DE LEON M.D.

## 2022-03-24 ENCOUNTER — HOSPITAL ENCOUNTER (OUTPATIENT)
Dept: RADIOLOGY | Facility: MEDICAL CENTER | Age: 52
End: 2022-03-24
Attending: OBSTETRICS & GYNECOLOGY
Payer: COMMERCIAL

## 2022-03-24 DIAGNOSIS — N95.1 SYMPTOMATIC MENOPAUSAL OR FEMALE CLIMACTERIC STATES: ICD-10-CM

## 2022-03-24 PROCEDURE — 77080 DXA BONE DENSITY AXIAL: CPT

## 2022-04-07 ENCOUNTER — PATIENT MESSAGE (OUTPATIENT)
Dept: ENDOCRINOLOGY | Facility: MEDICAL CENTER | Age: 52
End: 2022-04-07
Payer: COMMERCIAL

## 2022-04-07 DIAGNOSIS — R73.03 PRE-DIABETES: ICD-10-CM

## 2022-04-11 RX ORDER — METFORMIN HYDROCHLORIDE 500 MG/1
500 TABLET, EXTENDED RELEASE ORAL 2 TIMES DAILY
Qty: 180 TABLET | Refills: 3 | Status: SHIPPED | OUTPATIENT
Start: 2022-04-11 | End: 2022-07-26 | Stop reason: SDUPTHER

## 2022-06-22 DIAGNOSIS — E03.8 HYPOTHYROIDISM DUE TO HASHIMOTO'S THYROIDITIS: ICD-10-CM

## 2022-06-22 DIAGNOSIS — E06.3 HYPOTHYROIDISM DUE TO HASHIMOTO'S THYROIDITIS: ICD-10-CM

## 2022-06-23 RX ORDER — LEVOTHYROXINE SODIUM 112 MCG
TABLET ORAL
Qty: 90 TABLET | Refills: 2 | Status: SHIPPED | OUTPATIENT
Start: 2022-06-23 | End: 2023-07-25 | Stop reason: SDUPTHER

## 2022-07-06 DIAGNOSIS — E06.3 HYPOTHYROIDISM DUE TO HASHIMOTO'S THYROIDITIS: ICD-10-CM

## 2022-07-06 DIAGNOSIS — E03.8 HYPOTHYROIDISM DUE TO HASHIMOTO'S THYROIDITIS: ICD-10-CM

## 2022-07-06 RX ORDER — LIOTHYRONINE SODIUM 5 UG/1
TABLET ORAL
Qty: 180 TABLET | Refills: 1 | Status: SHIPPED | OUTPATIENT
Start: 2022-07-06 | End: 2022-07-26 | Stop reason: SDUPTHER

## 2022-07-19 DIAGNOSIS — E55.9 VITAMIN D DEFICIENCY: ICD-10-CM

## 2022-07-20 RX ORDER — ERGOCALCIFEROL 1.25 MG/1
CAPSULE ORAL
Qty: 12 CAPSULE | Refills: 5 | Status: SHIPPED | OUTPATIENT
Start: 2022-07-20 | End: 2023-04-28

## 2022-07-23 ENCOUNTER — HOSPITAL ENCOUNTER (OUTPATIENT)
Dept: LAB | Facility: MEDICAL CENTER | Age: 52
End: 2022-07-23
Attending: INTERNAL MEDICINE
Payer: COMMERCIAL

## 2022-07-23 DIAGNOSIS — E04.1 THYROID NODULE: ICD-10-CM

## 2022-07-23 DIAGNOSIS — E55.9 VITAMIN D DEFICIENCY: ICD-10-CM

## 2022-07-23 DIAGNOSIS — R73.03 PRE-DIABETES: ICD-10-CM

## 2022-07-23 DIAGNOSIS — E03.8 HYPOTHYROIDISM DUE TO HASHIMOTO'S THYROIDITIS: ICD-10-CM

## 2022-07-23 DIAGNOSIS — L65.9 HAIR LOSS: ICD-10-CM

## 2022-07-23 DIAGNOSIS — E06.3 HYPOTHYROIDISM DUE TO HASHIMOTO'S THYROIDITIS: ICD-10-CM

## 2022-07-23 LAB
25(OH)D3 SERPL-MCNC: 48 NG/ML (ref 30–100)
ALBUMIN SERPL BCP-MCNC: 4.4 G/DL (ref 3.2–4.9)
ALBUMIN/GLOB SERPL: 1.8 G/DL
ALP SERPL-CCNC: 50 U/L (ref 30–99)
ALT SERPL-CCNC: 18 U/L (ref 2–50)
ANION GAP SERPL CALC-SCNC: 12 MMOL/L (ref 7–16)
AST SERPL-CCNC: 18 U/L (ref 12–45)
BILIRUB SERPL-MCNC: 0.4 MG/DL (ref 0.1–1.5)
BUN SERPL-MCNC: 17 MG/DL (ref 8–22)
CALCIUM SERPL-MCNC: 9.5 MG/DL (ref 8.5–10.5)
CHLORIDE SERPL-SCNC: 105 MMOL/L (ref 96–112)
CO2 SERPL-SCNC: 23 MMOL/L (ref 20–33)
CREAT SERPL-MCNC: 0.91 MG/DL (ref 0.5–1.4)
FERRITIN SERPL-MCNC: 147 NG/ML (ref 10–291)
GFR SERPLBLD CREATININE-BSD FMLA CKD-EPI: 75 ML/MIN/1.73 M 2
GLOBULIN SER CALC-MCNC: 2.5 G/DL (ref 1.9–3.5)
GLUCOSE SERPL-MCNC: 85 MG/DL (ref 65–99)
IRON SATN MFR SERPL: 32 % (ref 15–55)
IRON SERPL-MCNC: 85 UG/DL (ref 40–170)
POTASSIUM SERPL-SCNC: 4.9 MMOL/L (ref 3.6–5.5)
PROT SERPL-MCNC: 6.9 G/DL (ref 6–8.2)
SODIUM SERPL-SCNC: 140 MMOL/L (ref 135–145)
T3FREE SERPL-MCNC: 2.82 PG/ML (ref 2–4.4)
T4 FREE SERPL-MCNC: 1.58 NG/DL (ref 0.93–1.7)
TIBC SERPL-MCNC: 265 UG/DL (ref 250–450)
TSH SERPL DL<=0.005 MIU/L-ACNC: 0.06 UIU/ML (ref 0.38–5.33)
UIBC SERPL-MCNC: 180 UG/DL (ref 110–370)
VIT B12 SERPL-MCNC: 717 PG/ML (ref 211–911)

## 2022-07-23 PROCEDURE — 83540 ASSAY OF IRON: CPT

## 2022-07-23 PROCEDURE — 84439 ASSAY OF FREE THYROXINE: CPT

## 2022-07-23 PROCEDURE — 82306 VITAMIN D 25 HYDROXY: CPT

## 2022-07-23 PROCEDURE — 84481 FREE ASSAY (FT-3): CPT

## 2022-07-23 PROCEDURE — 36415 COLL VENOUS BLD VENIPUNCTURE: CPT

## 2022-07-23 PROCEDURE — 83550 IRON BINDING TEST: CPT

## 2022-07-23 PROCEDURE — 80053 COMPREHEN METABOLIC PANEL: CPT

## 2022-07-23 PROCEDURE — 82728 ASSAY OF FERRITIN: CPT

## 2022-07-23 PROCEDURE — 82607 VITAMIN B-12: CPT

## 2022-07-23 PROCEDURE — 84443 ASSAY THYROID STIM HORMONE: CPT

## 2022-07-26 ENCOUNTER — OFFICE VISIT (OUTPATIENT)
Dept: ENDOCRINOLOGY | Facility: MEDICAL CENTER | Age: 52
End: 2022-07-26
Attending: INTERNAL MEDICINE
Payer: COMMERCIAL

## 2022-07-26 VITALS
SYSTOLIC BLOOD PRESSURE: 110 MMHG | OXYGEN SATURATION: 97 % | HEIGHT: 70 IN | WEIGHT: 219 LBS | DIASTOLIC BLOOD PRESSURE: 82 MMHG | HEART RATE: 81 BPM | BODY MASS INDEX: 31.35 KG/M2

## 2022-07-26 DIAGNOSIS — E06.3 HYPOTHYROIDISM DUE TO HASHIMOTO'S THYROIDITIS: ICD-10-CM

## 2022-07-26 DIAGNOSIS — E55.9 VITAMIN D DEFICIENCY: ICD-10-CM

## 2022-07-26 DIAGNOSIS — E03.8 HYPOTHYROIDISM DUE TO HASHIMOTO'S THYROIDITIS: ICD-10-CM

## 2022-07-26 DIAGNOSIS — E04.1 THYROID NODULE: ICD-10-CM

## 2022-07-26 DIAGNOSIS — R73.03 PRE-DIABETES: ICD-10-CM

## 2022-07-26 LAB
HBA1C MFR BLD: 5.4 % (ref 0–5.6)
INT CON NEG: NORMAL
INT CON POS: NORMAL

## 2022-07-26 PROCEDURE — 99212 OFFICE O/P EST SF 10 MIN: CPT | Performed by: INTERNAL MEDICINE

## 2022-07-26 PROCEDURE — 83036 HEMOGLOBIN GLYCOSYLATED A1C: CPT | Performed by: INTERNAL MEDICINE

## 2022-07-26 PROCEDURE — 99214 OFFICE O/P EST MOD 30 MIN: CPT | Performed by: INTERNAL MEDICINE

## 2022-07-26 PROCEDURE — 99211 OFF/OP EST MAY X REQ PHY/QHP: CPT | Performed by: INTERNAL MEDICINE

## 2022-07-26 RX ORDER — LIOTHYRONINE SODIUM 5 UG/1
5 TABLET ORAL 2 TIMES DAILY
Qty: 180 TABLET | Refills: 1 | Status: SHIPPED | OUTPATIENT
Start: 2022-07-26 | End: 2023-04-13

## 2022-07-26 RX ORDER — METFORMIN HYDROCHLORIDE 500 MG/1
1000 TABLET, EXTENDED RELEASE ORAL 2 TIMES DAILY
Qty: 360 TABLET | Refills: 2 | Status: SHIPPED | OUTPATIENT
Start: 2022-07-26 | End: 2023-02-28 | Stop reason: SDUPTHER

## 2022-07-26 NOTE — PROGRESS NOTES
Chief Complaint: Follow up for Primary Hypothyroidism secondary to Hashimoto's thyroiditis and history of vitamin D deficiency and thyroid nodules, she also has prediabetes.  He also has a right lower lobe thyroid nodule    HPI:     Mary Hull is a 52 y.o. female here for follow up of the above medical issues.   She was a patient of CLIFFORD Hwang.   She works as a speech pathologist      Currently she is on combination therapy with Synthroid brand name 112 MCG daily with Cytomel 5 MCG twice a day.    She reports good compliance    She reports that she is doing much better    TSH is slightly suppressed with her recent labs at 0.060 but free T4 is high normal at 1.58 Free T3 is normal at 2.8 on July 2022        She has prediabetes that is on Metformin at 1000 mg daily  A1c improved from 5.8% down to 5.4% on July 2022      She is on ergocalciferol 50,000 units weekly for vitamin D deficiency  Her vitamin D is 48 with a B12 of 717        Finally she has a history of a 1 cm hyperechoic benign solid nodule on the right lower lobe in the setting of a heterogenous thyroid gland.  This nodule appears low risk for malignancy per my read based on the ultrasound from July 22, 2019.  It is stable when compared to her previous ultrasound from July 2018.  She denies a family history of thyroid cancer and denies anterior neck compressive symptoms.    Repeat thyroid ultrasound on January 24, 2022 showed a stable less than 1 cm isoechoic solid nodule on the right lower lobe with smooth margins and no echogenic foci    I recommended observation of this nodule and repeating her ultrasound again in 12 to 24 months        Patient's medications, allergies, and social histories were reviewed and updated as appropriate.      ROS:     CONS:     No fever, no chills   EYES:     No diplopia, no blurry vision   CV:           No chest pain, no palpitations   PULM:     No SOB, no cough, no hemoptysis.   GI:            No nausea, no  "vomiting, no diarrhea, no constipation   ENDO:     No polyuria, no polydipsia, no heat intolerance, no cold intolerance       Past Medical History:  Problem List:  2019-04: Pre-diabetes  2019-03: Vitamin D deficiency  2017-08: Hypothyroidism due to Hashimoto's thyroiditis  2017-08: Thyroid nodule  2017-08: Weight gain      Past Surgical History:  Past Surgical History:   Procedure Laterality Date   • VAGINAL HYSTERECTOMY SCOPE TOTAL  8/22/08    Performed by KATIA PANDEY at [facility]   • CYSTOSCOPY  8/22/08    Performed by KATIA PANDEY at [facility]   • US-NEEDLE CORE BX-BREAST PANEL          Allergies:  Sulfa drugs; Tetracycline; Latex; and Tape     Social History:  Social History     Tobacco Use   • Smoking status: Never Smoker   • Smokeless tobacco: Never Used   Vaping Use   • Vaping Use: Never used   Substance Use Topics   • Alcohol use: Not Currently   • Drug use: No        Family History:   family history includes Cancer in her mother; Diabetes in her father; Heart Disease in her father and mother; Kidney Disease in her father; Thyroid in her sister.      PHYSICAL EXAM:   Vital signs: /82 (BP Location: Left arm, Patient Position: Sitting, BP Cuff Size: Adult)   Pulse 81   Ht 1.778 m (5' 10\")   Wt 99.3 kg (219 lb)   SpO2 97%   BMI 31.42 kg/m²   GENERAL: Well-developed, well-nourished in no apparent distress.   EYE:  No ocular asymmetry, PERRLA  HENT: Pink, moist mucous membranes.    NECK: Thyroid feels slightly less enlarged  CARDIOVASCULAR:  No murmurs  LUNGS: Clear breath sounds  ABDOMEN: Soft, nontender   EXTREMITIES: No clubbing, cyanosis, or edema.   NEUROLOGICAL: No gross focal motor abnormalities   LYMPH: No cervical adenopathy seen   SKIN: No rashes, lesions.       Labs:  Lab Results   Component Value Date/Time    SODIUM 140 07/23/2022 11:11 AM    POTASSIUM 4.9 07/23/2022 11:11 AM    CHLORIDE 105 07/23/2022 11:11 AM    CO2 23 07/23/2022 11:11 AM "    ANION 12.0 07/23/2022 11:11 AM    GLUCOSE 85 07/23/2022 11:11 AM    BUN 17 07/23/2022 11:11 AM    CREATININE 0.91 07/23/2022 11:11 AM    CREATININE 1.1 08/19/2008 11:59 AM    CALCIUM 9.5 07/23/2022 11:11 AM    ASTSGOT 18 07/23/2022 11:11 AM    ALTSGPT 18 07/23/2022 11:11 AM    TBILIRUBIN 0.4 07/23/2022 11:11 AM    ALBUMIN 4.4 07/23/2022 11:11 AM    TOTPROTEIN 6.9 07/23/2022 11:11 AM    GLOBULIN 2.5 07/23/2022 11:11 AM    AGRATIO 1.8 07/23/2022 11:11 AM       Lab Results   Component Value Date/Time    SODIUM 138 07/18/2019 0937    POTASSIUM 4.4 07/18/2019 0937    CHLORIDE 102 07/18/2019 0937    CO2 22 07/18/2019 0937    GLUCOSE 95 07/18/2019 0937    BUN 17 07/18/2019 0937    CREATININE 1.15 (H) 07/18/2019 0937    CALCIUM 9.3 07/18/2019 0937    ANION 6.0 08/10/2016 1522       Lab Results   Component Value Date/Time    CHOLSTRLTOT 177 03/08/2019 1015    TRIGLYCERIDE 109 03/08/2019 1015    HDL 40 03/08/2019 1015     (H) 03/08/2019 1015       Lab Results   Component Value Date/Time    TSHULTRASEN 1.160 08/15/2017 0832     Lab Results   Component Value Date/Time    FREET4 0.88 08/15/2017 0832     No results found for: FREET3  No results found for: THYSTIMIG    No results found for: MICROSOMALA      Imaging: Please refer to thyroid ultrasound from July 22, 2019      ASSESSMENT/PLAN:     1. Hypothyroidism due to Hashimoto's thyroiditis  Controlled,   clinically patient is euthyroid  TSH is mildly low but this is still within tolerable limits  Continue  Synthroid 112 and Cytomel 5 twice a day  follow-up in 6 months with repeat labs    2. Thyroid nodule  Stable  Low risk isoechoic to hyperechoic solid nodule measuring less than 1 cm on the right lower lobe  We will schedule for ultrasound again in the office in 6 months    3. Pre-diabetes  Controlled  A1c stable  Continue Metformin extended release 1000 mg twice a day  Repeat A1c in 6 to 12 months    4. Vitamin D deficiency  Controlled  Continue ergocalciferol  50,000 units weekly   Continue monitoring    No follow-ups on file.      Thank you kindly for allowing me to participate in the thyroid care plan for this patient.    Cordell Cardoza MD, [facility], [facility]  01/21/20    CC:   Crow DE LEON M.D.

## 2023-01-23 ENCOUNTER — PROCEDURE VISIT (OUTPATIENT)
Dept: ENDOCRINOLOGY | Facility: MEDICAL CENTER | Age: 53
End: 2023-01-23
Attending: INTERNAL MEDICINE
Payer: COMMERCIAL

## 2023-01-23 DIAGNOSIS — E03.8 HYPOTHYROIDISM DUE TO HASHIMOTO'S THYROIDITIS: ICD-10-CM

## 2023-01-23 DIAGNOSIS — E06.3 HYPOTHYROIDISM DUE TO HASHIMOTO'S THYROIDITIS: ICD-10-CM

## 2023-01-23 DIAGNOSIS — E04.1 THYROID NODULE: ICD-10-CM

## 2023-01-23 PROCEDURE — 76536 US EXAM OF HEAD AND NECK: CPT | Performed by: INTERNAL MEDICINE

## 2023-01-23 NOTE — PROGRESS NOTES
Thyroid US done on this procedure visit  Please see dictated POC US report completed by endocrinologist  Patient advised to follow up as planned with labs prior    Cordell Cardoza M.D.

## 2023-01-27 ENCOUNTER — HOSPITAL ENCOUNTER (OUTPATIENT)
Dept: LAB | Facility: MEDICAL CENTER | Age: 53
End: 2023-01-27
Attending: INTERNAL MEDICINE
Payer: COMMERCIAL

## 2023-01-27 DIAGNOSIS — E06.3 HYPOTHYROIDISM DUE TO HASHIMOTO'S THYROIDITIS: ICD-10-CM

## 2023-01-27 DIAGNOSIS — E04.1 THYROID NODULE: ICD-10-CM

## 2023-01-27 DIAGNOSIS — R73.03 PRE-DIABETES: ICD-10-CM

## 2023-01-27 DIAGNOSIS — E55.9 VITAMIN D DEFICIENCY: ICD-10-CM

## 2023-01-27 DIAGNOSIS — E03.8 HYPOTHYROIDISM DUE TO HASHIMOTO'S THYROIDITIS: ICD-10-CM

## 2023-01-27 LAB
25(OH)D3 SERPL-MCNC: 41 NG/ML (ref 30–100)
ALBUMIN SERPL BCP-MCNC: 4.5 G/DL (ref 3.2–4.9)
ALBUMIN/GLOB SERPL: 1.7 G/DL
ALP SERPL-CCNC: 48 U/L (ref 30–99)
ALT SERPL-CCNC: 23 U/L (ref 2–50)
ANION GAP SERPL CALC-SCNC: 11 MMOL/L (ref 7–16)
AST SERPL-CCNC: 23 U/L (ref 12–45)
BILIRUB SERPL-MCNC: 0.4 MG/DL (ref 0.1–1.5)
BUN SERPL-MCNC: 13 MG/DL (ref 8–22)
CALCIUM ALBUM COR SERPL-MCNC: 9.3 MG/DL (ref 8.5–10.5)
CALCIUM SERPL-MCNC: 9.7 MG/DL (ref 8.5–10.5)
CHLORIDE SERPL-SCNC: 105 MMOL/L (ref 96–112)
CO2 SERPL-SCNC: 27 MMOL/L (ref 20–33)
CREAT SERPL-MCNC: 0.97 MG/DL (ref 0.5–1.4)
GFR SERPLBLD CREATININE-BSD FMLA CKD-EPI: 69 ML/MIN/1.73 M 2
GLOBULIN SER CALC-MCNC: 2.6 G/DL (ref 1.9–3.5)
GLUCOSE SERPL-MCNC: 83 MG/DL (ref 65–99)
POTASSIUM SERPL-SCNC: 4.2 MMOL/L (ref 3.6–5.5)
PROT SERPL-MCNC: 7.1 G/DL (ref 6–8.2)
SODIUM SERPL-SCNC: 143 MMOL/L (ref 135–145)
T3FREE SERPL-MCNC: 2.89 PG/ML (ref 2–4.4)
T4 FREE SERPL-MCNC: 1.43 NG/DL (ref 0.93–1.7)
TSH SERPL DL<=0.005 MIU/L-ACNC: 0.15 UIU/ML (ref 0.38–5.33)
VIT B12 SERPL-MCNC: 891 PG/ML (ref 211–911)

## 2023-01-27 PROCEDURE — 80053 COMPREHEN METABOLIC PANEL: CPT

## 2023-01-27 PROCEDURE — 36415 COLL VENOUS BLD VENIPUNCTURE: CPT

## 2023-01-27 PROCEDURE — 82607 VITAMIN B-12: CPT

## 2023-01-27 PROCEDURE — 82306 VITAMIN D 25 HYDROXY: CPT

## 2023-01-27 PROCEDURE — 84481 FREE ASSAY (FT-3): CPT

## 2023-01-27 PROCEDURE — 84443 ASSAY THYROID STIM HORMONE: CPT

## 2023-01-27 PROCEDURE — 84439 ASSAY OF FREE THYROXINE: CPT

## 2023-01-30 ENCOUNTER — TELEMEDICINE (OUTPATIENT)
Dept: ENDOCRINOLOGY | Facility: MEDICAL CENTER | Age: 53
End: 2023-01-30
Attending: INTERNAL MEDICINE
Payer: COMMERCIAL

## 2023-01-30 DIAGNOSIS — E03.8 HYPOTHYROIDISM DUE TO HASHIMOTO'S THYROIDITIS: ICD-10-CM

## 2023-01-30 DIAGNOSIS — E55.9 VITAMIN D DEFICIENCY: ICD-10-CM

## 2023-01-30 DIAGNOSIS — R73.03 PRE-DIABETES: ICD-10-CM

## 2023-01-30 DIAGNOSIS — E53.8 B12 DEFICIENCY: ICD-10-CM

## 2023-01-30 DIAGNOSIS — E04.1 THYROID NODULE: ICD-10-CM

## 2023-01-30 DIAGNOSIS — E06.3 HYPOTHYROIDISM DUE TO HASHIMOTO'S THYROIDITIS: ICD-10-CM

## 2023-01-30 PROCEDURE — 99214 OFFICE O/P EST MOD 30 MIN: CPT | Mod: 95 | Performed by: INTERNAL MEDICINE

## 2023-01-30 ASSESSMENT — PATIENT HEALTH QUESTIONNAIRE - PHQ9: CLINICAL INTERPRETATION OF PHQ2 SCORE: 0

## 2023-01-30 NOTE — PROGRESS NOTES
Chief Complaint: Follow up for Primary Hypothyroidism secondary to Hashimoto's thyroiditis and history of vitamin D deficiency and thyroid nodules, prediabetes.   Patient was presented for a telehealth consultation via secure and encrypted videoconferencing technology. This encounter was conducted via Zoom . Verbal consent was obtained. Patient's identity was verified.      HPI:     Mary Hull is a 52 y.o. female here for follow up of the above medical issues.   She was a patient of CLIFFORD Hwang.   She works as a speech pathologist      Currently she is on combination therapy with Synthroid brand name 112 MCG daily with Cytomel 5 MCG twice a day.    She reports good compliance  She reports fatigue       TSH is slightly low at 0.150 with a normal free T4 and free T3 level on Jan 2024  TSH was slightly suppressed with her recent labs at 0.060 but free T4 is high normal at 1.58 Free T3 is normal at 2.8 on July 2022        She has prediabetes that is on Metformin at 1000 mg daily  A1c improved from 5.8% down to 5.4% on July 2022      She is on ergocalciferol 50,000 units weekly for vitamin D deficiency  Celiac dse  was negative   Her vitamin D is 41   with a B12 of 891        Finally she has a history of a 1 cm solid nodule on the right lower lobe in the setting of a heterogenous thyroid gland first diagnosed 2017.   She denies a family history of thyroid cancer and denies anterior neck compressive symptoms.  Repeat thyroid ultrasound on January 24, 2023  showed a stable less than 1 cm isoechoic to slightly hypoechoic  solid nodule on the right lower lobe with smooth margins and no echogenic foci  I recommended observation of this nodule and repeating her ultrasound again in 24 months        Patient's medications, allergies, and social histories were reviewed and updated as appropriate.      ROS:     CONS:     No fever, no chills   EYES:     No diplopia, no blurry vision   CV:           No chest pain,  no palpitations   PULM:     No SOB, no cough, no hemoptysis.   GI:            No nausea, no vomiting, no diarrhea, no constipation   ENDO:     No polyuria, no polydipsia, no heat intolerance, no cold intolerance       Past Medical History:  Problem List:  2019-04: Pre-diabetes  2019-03: Vitamin D deficiency  2017-08: Hypothyroidism due to Hashimoto's thyroiditis  2017-08: Thyroid nodule  2017-08: Weight gain      Past Surgical History:  Past Surgical History:   Procedure Laterality Date    VAGINAL HYSTERECTOMY SCOPE TOTAL  8/22/08    Performed by KATIA PANDEY at [facility]    CYSTOSCOPY  8/22/08    Performed by KATIA PANDEY at [facility]    US-NEEDLE CORE BX-BREAST PANEL          Allergies:  Sulfa drugs; Tetracycline; Latex; and Tape     Social History:  Social History     Tobacco Use    Smoking status: Never    Smokeless tobacco: Never   Vaping Use    Vaping Use: Never used   Substance Use Topics    Alcohol use: Not Currently    Drug use: No        Family History:   family history includes Cancer in her mother; Diabetes in her father; Heart Disease in her father and mother; Kidney Disease in her father; Thyroid in her sister.      PHYSICAL EXAM:   Vital signs: There were no vitals taken for this visit.  GENERAL: Well-developed, well-nourished in no apparent distress.   EYE:  No ocular asymmetry, PERRLA  HENT: Pink, moist mucous membranes.    NECK: Thyroid feels slightly less enlarged  CARDIOVASCULAR:  No murmurs  LUNGS: Clear breath sounds  ABDOMEN: Soft, nontender   EXTREMITIES: No clubbing, cyanosis, or edema.   NEUROLOGICAL: No gross focal motor abnormalities   LYMPH: No cervical adenopathy seen   SKIN: No rashes, lesions.       Labs:  Lab Results   Component Value Date/Time    SODIUM 143 01/27/2023 12:27 PM    POTASSIUM 4.2 01/27/2023 12:27 PM    CHLORIDE 105 01/27/2023 12:27 PM    CO2 27 01/27/2023 12:27 PM    ANION 11.0 01/27/2023 12:27 PM    GLUCOSE 83  2023 12:27 PM    BUN 13 2023 12:27 PM    CREATININE 0.97 2023 12:27 PM    CREATININE 1.1 2008 11:59 AM    CALCIUM 9.7 2023 12:27 PM    ASTSGOT 23 2023 12:27 PM    ALTSGPT 23 2023 12:27 PM    TBILIRUBIN 0.4 2023 12:27 PM    ALBUMIN 4.5 2023 12:27 PM    TOTPROTEIN 7.1 2023 12:27 PM    GLOBULIN 2.6 2023 12:27 PM    AGRATIO 1.7 2023 12:27 PM       Lab Results   Component Value Date/Time    SODIUM 138 2019 0937    POTASSIUM 4.4 2019 0937    CHLORIDE 102 2019 0937    CO2 22 2019 0937    GLUCOSE 95 2019 0937    BUN 17 2019 0937    CREATININE 1.15 (H) 2019 0937    CALCIUM 9.3 2019 0937    ANION 6.0 08/10/2016 1522       Lab Results   Component Value Date/Time    CHOLSTRLTOT 177 2019 1015    TRIGLYCERIDE 109 2019 1015    HDL 40 2019 1015     (H) 2019 1015       Lab Results   Component Value Date/Time    TSHULTRASEN 1.160 08/15/2017 0832     Lab Results   Component Value Date/Time    FREET4 0.88 08/15/2017 0832     No results found for: FREET3  No results found for: THYSTIMIG    No results found for: MICROSOMALA      Imagin/23/2023 10:30 AM     HISTORY/REASON FOR EXAM:  Follow-up for hyperthyroidism secondary Hashimoto's and thyroid nodule        TECHNIQUE/EXAM DESCRIPTION:  Ultrasound of the soft tissues of the head and neck.     COMPARISON:  Thyroid ultrasound 2022     FINDINGS:  The thyroid gland is heterogeneous.  Vascularity is normal.     The right lobe of the thyroid gland measures 3.48 cm x 1.25 cm x 1.10 cm cm.  The left lobe of the thyroid gland measures 3.59 cm x 1.01 cm x 0.88 cm cm.  The isthmus measures 0.19 cm cm.     Nodules >= 1cm:        Nodule #1  There is a(n) hypoechoic wider than tall solid nodule with smooth margins and no echogenic foci measuring 1.00 x 0.55 x 0.63 cm located on the right lower lobe.        There are no suspicious  lateral neck nodes seen     IMPRESSION:     Heterogenous thyroid gland with stable intermediate suspicion hypoechoic solid nodule in the right lower lobe with smooth margins     EMILY Recommendations  Observation - repeat US in 12 to 24 months in the office.              US report completed and dictated by  Cordell Cardoza MD, [facility], [facility]      ASSESSMENT/PLAN:     1. Hypothyroidism due to Hashimoto's thyroiditis  Controlled,   Her TSH is slightly low but this is within acceptable limits  Free T4 and free T3 levels are normal  Her TSH is also low because she is taking Cytomel  Continue  Synthroid 112 and Cytomel 5 twice a day  Follow-up in 6 months with labs including lipids and morning ACTH and cortisol levels because of fatigue    2. Thyroid nodule  Stable  Low risk isoechoic to slightly hypoechoic nodule measuring less than 1 cm in the right lower lobe  Recommend observation repeating ultrasound in 24 months    3. Pre-diabetes  Controlled  A1c stable  Continue Metformin extended release 1000 mg twice a day  I am repeating her A1c in 6 months    4. Vitamin D deficiency  Controlled  I want her to take additional vitamin D3 2000 IU daily to help boost her vitamin D levels  Continue ergocalciferol 50,000 units weekly   Continue monitoring    5. B12 deficiency  Stable  Will check B12 levels      Return in about 6 months (around 7/30/2023).      Thank you kindly for allowing me to participate in the thyroid care plan for this patient.    Cordell Cardoza MD, [facility], [facility]    CC:   Crow DE LEON M.D.

## 2023-01-31 ENCOUNTER — HOSPITAL ENCOUNTER (OUTPATIENT)
Dept: RADIOLOGY | Facility: MEDICAL CENTER | Age: 53
End: 2023-01-31
Attending: FAMILY MEDICINE
Payer: COMMERCIAL

## 2023-01-31 DIAGNOSIS — Z12.31 VISIT FOR SCREENING MAMMOGRAM: ICD-10-CM

## 2023-01-31 PROCEDURE — 77063 BREAST TOMOSYNTHESIS BI: CPT

## 2023-02-28 DIAGNOSIS — R73.03 PRE-DIABETES: ICD-10-CM

## 2023-02-28 RX ORDER — METFORMIN HYDROCHLORIDE 500 MG/1
1000 TABLET, EXTENDED RELEASE ORAL 2 TIMES DAILY
Qty: 360 TABLET | Refills: 2 | Status: SHIPPED | OUTPATIENT
Start: 2023-02-28 | End: 2023-08-15 | Stop reason: SDUPTHER

## 2023-04-13 DIAGNOSIS — E03.8 HYPOTHYROIDISM DUE TO HASHIMOTO'S THYROIDITIS: ICD-10-CM

## 2023-04-13 DIAGNOSIS — E06.3 HYPOTHYROIDISM DUE TO HASHIMOTO'S THYROIDITIS: ICD-10-CM

## 2023-04-13 RX ORDER — LIOTHYRONINE SODIUM 5 UG/1
TABLET ORAL
Qty: 180 TABLET | Refills: 1 | Status: SHIPPED | OUTPATIENT
Start: 2023-04-13 | End: 2023-08-15 | Stop reason: SDUPTHER

## 2023-04-26 DIAGNOSIS — E55.9 VITAMIN D DEFICIENCY: ICD-10-CM

## 2023-04-28 RX ORDER — ERGOCALCIFEROL 1.25 MG/1
CAPSULE ORAL
Qty: 12 CAPSULE | Refills: 5 | Status: SHIPPED | OUTPATIENT
Start: 2023-04-28 | End: 2023-08-15 | Stop reason: SDUPTHER

## 2023-07-25 DIAGNOSIS — E03.8 HYPOTHYROIDISM DUE TO HASHIMOTO'S THYROIDITIS: ICD-10-CM

## 2023-07-25 DIAGNOSIS — E06.3 HYPOTHYROIDISM DUE TO HASHIMOTO'S THYROIDITIS: ICD-10-CM

## 2023-07-25 RX ORDER — LEVOTHYROXINE SODIUM 112 MCG
TABLET ORAL
Qty: 90 TABLET | Refills: 2 | Status: SHIPPED | OUTPATIENT
Start: 2023-07-25 | End: 2023-08-01 | Stop reason: SDUPTHER

## 2023-08-01 ENCOUNTER — PHARMACY VISIT (OUTPATIENT)
Dept: PHARMACY | Facility: MEDICAL CENTER | Age: 53
End: 2023-08-01
Payer: COMMERCIAL

## 2023-08-01 DIAGNOSIS — E06.3 HYPOTHYROIDISM DUE TO HASHIMOTO'S THYROIDITIS: ICD-10-CM

## 2023-08-01 DIAGNOSIS — E03.8 HYPOTHYROIDISM DUE TO HASHIMOTO'S THYROIDITIS: ICD-10-CM

## 2023-08-01 PROCEDURE — RXMED WILLOW AMBULATORY MEDICATION CHARGE: Performed by: ANESTHESIOLOGY

## 2023-08-01 RX ORDER — LEVOTHYROXINE SODIUM 112 MCG
112 TABLET ORAL DAILY
Qty: 90 TABLET | Refills: 0 | OUTPATIENT
Start: 2023-08-01

## 2023-08-01 RX ORDER — LEVOTHYROXINE SODIUM 112 MCG
TABLET ORAL
Qty: 15 TABLET | Refills: 0 | Status: SHIPPED | OUTPATIENT
Start: 2023-08-01 | End: 2023-08-15 | Stop reason: SDUPTHER

## 2023-08-08 ENCOUNTER — TELEPHONE (OUTPATIENT)
Dept: ENDOCRINOLOGY | Facility: MEDICAL CENTER | Age: 53
End: 2023-08-08
Payer: COMMERCIAL

## 2023-08-11 ENCOUNTER — HOSPITAL ENCOUNTER (OUTPATIENT)
Dept: LAB | Facility: MEDICAL CENTER | Age: 53
End: 2023-08-11
Attending: INTERNAL MEDICINE
Payer: COMMERCIAL

## 2023-08-11 DIAGNOSIS — E53.8 B12 DEFICIENCY: ICD-10-CM

## 2023-08-11 DIAGNOSIS — E04.1 THYROID NODULE: ICD-10-CM

## 2023-08-11 DIAGNOSIS — E03.8 HYPOTHYROIDISM DUE TO HASHIMOTO'S THYROIDITIS: ICD-10-CM

## 2023-08-11 DIAGNOSIS — R73.03 PRE-DIABETES: ICD-10-CM

## 2023-08-11 DIAGNOSIS — E55.9 VITAMIN D DEFICIENCY: ICD-10-CM

## 2023-08-11 DIAGNOSIS — E06.3 HYPOTHYROIDISM DUE TO HASHIMOTO'S THYROIDITIS: ICD-10-CM

## 2023-08-11 LAB
25(OH)D3 SERPL-MCNC: 46 NG/ML (ref 30–100)
ALBUMIN SERPL BCP-MCNC: 4.3 G/DL (ref 3.2–4.9)
ALBUMIN/GLOB SERPL: 1.7 G/DL
ALP SERPL-CCNC: 57 U/L (ref 30–99)
ALT SERPL-CCNC: 20 U/L (ref 2–50)
ANION GAP SERPL CALC-SCNC: 13 MMOL/L (ref 7–16)
AST SERPL-CCNC: 20 U/L (ref 12–45)
BILIRUB SERPL-MCNC: 0.5 MG/DL (ref 0.1–1.5)
BUN SERPL-MCNC: 13 MG/DL (ref 8–22)
CALCIUM ALBUM COR SERPL-MCNC: 9.3 MG/DL (ref 8.5–10.5)
CALCIUM SERPL-MCNC: 9.5 MG/DL (ref 8.5–10.5)
CHLORIDE SERPL-SCNC: 104 MMOL/L (ref 96–112)
CHOLEST SERPL-MCNC: 174 MG/DL (ref 100–199)
CO2 SERPL-SCNC: 25 MMOL/L (ref 20–33)
CORTIS SERPL-MCNC: 10.8 UG/DL (ref 0–23)
CREAT SERPL-MCNC: 0.96 MG/DL (ref 0.5–1.4)
EST. AVERAGE GLUCOSE BLD GHB EST-MCNC: 123 MG/DL
FASTING STATUS PATIENT QL REPORTED: NORMAL
GFR SERPLBLD CREATININE-BSD FMLA CKD-EPI: 70 ML/MIN/1.73 M 2
GLOBULIN SER CALC-MCNC: 2.5 G/DL (ref 1.9–3.5)
GLUCOSE SERPL-MCNC: 93 MG/DL (ref 65–99)
HBA1C MFR BLD: 5.9 % (ref 4–5.6)
HDLC SERPL-MCNC: 41 MG/DL
LDLC SERPL CALC-MCNC: 109 MG/DL
POTASSIUM SERPL-SCNC: 4.5 MMOL/L (ref 3.6–5.5)
PROT SERPL-MCNC: 6.8 G/DL (ref 6–8.2)
SODIUM SERPL-SCNC: 142 MMOL/L (ref 135–145)
T3FREE SERPL-MCNC: 2.86 PG/ML (ref 2–4.4)
T4 FREE SERPL-MCNC: 1.51 NG/DL (ref 0.93–1.7)
TRIGL SERPL-MCNC: 120 MG/DL (ref 0–149)
TSH SERPL DL<=0.005 MIU/L-ACNC: 0.07 UIU/ML (ref 0.38–5.33)
VIT B12 SERPL-MCNC: 620 PG/ML (ref 211–911)

## 2023-08-11 PROCEDURE — 82306 VITAMIN D 25 HYDROXY: CPT

## 2023-08-11 PROCEDURE — 84481 FREE ASSAY (FT-3): CPT

## 2023-08-11 PROCEDURE — 80061 LIPID PANEL: CPT

## 2023-08-11 PROCEDURE — 36415 COLL VENOUS BLD VENIPUNCTURE: CPT

## 2023-08-11 PROCEDURE — 84439 ASSAY OF FREE THYROXINE: CPT

## 2023-08-11 PROCEDURE — 82607 VITAMIN B-12: CPT

## 2023-08-11 PROCEDURE — 82024 ASSAY OF ACTH: CPT

## 2023-08-11 PROCEDURE — 84443 ASSAY THYROID STIM HORMONE: CPT

## 2023-08-11 PROCEDURE — 83516 IMMUNOASSAY NONANTIBODY: CPT

## 2023-08-11 PROCEDURE — 82533 TOTAL CORTISOL: CPT

## 2023-08-11 PROCEDURE — 83036 HEMOGLOBIN GLYCOSYLATED A1C: CPT

## 2023-08-11 PROCEDURE — 80053 COMPREHEN METABOLIC PANEL: CPT

## 2023-08-13 LAB — ACTH PLAS-MCNC: 22.3 PG/ML (ref 7.2–63.3)

## 2023-08-15 ENCOUNTER — OFFICE VISIT (OUTPATIENT)
Dept: ENDOCRINOLOGY | Facility: MEDICAL CENTER | Age: 53
End: 2023-08-15
Attending: INTERNAL MEDICINE
Payer: COMMERCIAL

## 2023-08-15 VITALS
BODY MASS INDEX: 32.6 KG/M2 | HEART RATE: 96 BPM | HEIGHT: 70 IN | WEIGHT: 227.7 LBS | RESPIRATION RATE: 20 BRPM | SYSTOLIC BLOOD PRESSURE: 124 MMHG | DIASTOLIC BLOOD PRESSURE: 76 MMHG | OXYGEN SATURATION: 96 %

## 2023-08-15 DIAGNOSIS — E04.1 THYROID NODULE: ICD-10-CM

## 2023-08-15 DIAGNOSIS — E03.8 HYPOTHYROIDISM DUE TO HASHIMOTO'S THYROIDITIS: ICD-10-CM

## 2023-08-15 DIAGNOSIS — E55.9 VITAMIN D DEFICIENCY: ICD-10-CM

## 2023-08-15 DIAGNOSIS — E53.8 B12 DEFICIENCY: ICD-10-CM

## 2023-08-15 DIAGNOSIS — E06.3 HYPOTHYROIDISM DUE TO HASHIMOTO'S THYROIDITIS: ICD-10-CM

## 2023-08-15 DIAGNOSIS — R73.03 PRE-DIABETES: ICD-10-CM

## 2023-08-15 PROCEDURE — 99211 OFF/OP EST MAY X REQ PHY/QHP: CPT | Performed by: INTERNAL MEDICINE

## 2023-08-15 PROCEDURE — 3078F DIAST BP <80 MM HG: CPT | Performed by: INTERNAL MEDICINE

## 2023-08-15 PROCEDURE — 99214 OFFICE O/P EST MOD 30 MIN: CPT | Performed by: INTERNAL MEDICINE

## 2023-08-15 PROCEDURE — 3074F SYST BP LT 130 MM HG: CPT | Performed by: INTERNAL MEDICINE

## 2023-08-15 RX ORDER — LEVOTHYROXINE SODIUM 112 MCG
TABLET ORAL
Qty: 15 TABLET | Refills: 0 | Status: SHIPPED | OUTPATIENT
Start: 2023-08-15 | End: 2023-08-18

## 2023-08-15 RX ORDER — ERGOCALCIFEROL 1.25 MG/1
CAPSULE ORAL
Qty: 12 CAPSULE | Refills: 5 | Status: SHIPPED | OUTPATIENT
Start: 2023-08-15

## 2023-08-15 RX ORDER — AMOXICILLIN AND CLAVULANATE POTASSIUM 875; 125 MG/1; MG/1
1 TABLET, FILM COATED ORAL 2 TIMES DAILY
COMMUNITY
Start: 2023-05-23 | End: 2023-08-15

## 2023-08-15 RX ORDER — METFORMIN HYDROCHLORIDE 500 MG/1
1000 TABLET, EXTENDED RELEASE ORAL 2 TIMES DAILY
Qty: 360 TABLET | Refills: 2 | Status: SHIPPED | OUTPATIENT
Start: 2023-08-15

## 2023-08-15 RX ORDER — METHYLPREDNISOLONE 4 MG/1
TABLET ORAL
COMMUNITY
Start: 2023-05-23 | End: 2023-08-15

## 2023-08-15 RX ORDER — LIOTHYRONINE SODIUM 5 UG/1
5 TABLET ORAL 2 TIMES DAILY
Qty: 180 TABLET | Refills: 1 | Status: SHIPPED | OUTPATIENT
Start: 2023-08-15

## 2023-08-15 NOTE — PROGRESS NOTES
Chief Complaint: Follow up for Primary Hypothyroidism secondary to Hashimoto's thyroiditis and history of vitamin D deficiency and thyroid nodules, prediabetes.       HPI:     Mary Hull is a 53 y.o. female here for follow up of the above medical issues.   She was a patient of CLIFFORD Hwang.   She works as a speech pathologist      Currently she is on combination therapy with Synthroid brand name 112 MCG daily with Cytomel 5 MCG twice a day.    She reports good compliance    She feels well  She has chronic muscle cramps and she takes magnesium for this  She denies radicular symptoms       Latest Reference Range & Units 01/27/23 12:27 08/11/23 11:15   TSH 0.380 - 5.330 uIU/mL 0.150 (L) 0.070 (L)   Free T-4 0.93 - 1.70 ng/dL 1.43 1.51   T3,Free 2.00 - 4.40 pg/mL 2.89 2.86         She has prediabetes that is on Metformin at 1000 mg daily  A1c is 5.9% on 8/2023      She is on ergocalciferol 50,000 units weekly for vitamin D deficiency  Celiac dse  was negative      Latest Reference Range & Units 08/11/23 11:15   25-Hydroxy   Vitamin D 25 30 - 100 ng/mL 46   Cortisol 0.0 - 23.0 ug/dL 10.8   Vitamin B12 -True Cobalamin 211 - 911 pg/mL 620       Finally she has a history of a 1 cm solid nodule on the right lower lobe in the setting of a heterogenous thyroid gland first diagnosed 2017.   She denies a family history of thyroid cancer and denies anterior neck compressive symptoms.  Repeat thyroid ultrasound on January 24, 2023  showed a stable less than 1 cm isoechoic to slightly hypoechoic  solid nodule on the right lower lobe with smooth margins and no echogenic foci  I recommended observation of this nodule and repeating her ultrasound again in 24 months        Patient's medications, allergies, and social histories were reviewed and updated as appropriate.      ROS:     CONS:     No fever, no chills   EYES:     No diplopia, no blurry vision   CV:           No chest pain, no palpitations   PULM:     No SOB,  "no cough, no hemoptysis.   GI:            No nausea, no vomiting, no diarrhea, no constipation   ENDO:     No polyuria, no polydipsia, no heat intolerance, no cold intolerance       Past Medical History:  Problem List:  2019-04: Pre-diabetes  2019-03: Vitamin D deficiency  2017-08: Hypothyroidism due to Hashimoto's thyroiditis  2017-08: Thyroid nodule  2017-08: Weight gain      Past Surgical History:  Past Surgical History:   Procedure Laterality Date    VAGINAL HYSTERECTOMY SCOPE TOTAL  8/22/08    Performed by KATIA PANDEY at [facility]    CYSTOSCOPY  8/22/08    Performed by KATIA PANDEY at [facility]    US-NEEDLE CORE BX-BREAST PANEL          Allergies:  Sulfa drugs; Tetracycline; Latex; and Tape     Social History:  Social History     Tobacco Use    Smoking status: Never    Smokeless tobacco: Never   Vaping Use    Vaping Use: Never used   Substance Use Topics    Alcohol use: Not Currently    Drug use: No        Family History:   family history includes Cancer in her mother; Diabetes in her father; Heart Disease in her father and mother; Kidney Disease in her father; Thyroid in her sister.      PHYSICAL EXAM:   Vital signs: /76 (BP Location: Left arm, Patient Position: Sitting, BP Cuff Size: Adult)   Pulse 96   Resp 20   Ht 1.778 m (5' 10\")   Wt 103 kg (227 lb 11.2 oz)   SpO2 96%   BMI 32.67 kg/m²   GENERAL: Well-developed, well-nourished in no apparent distress.   EYE:  No ocular asymmetry, PERRLA  HENT: Pink, moist mucous membranes.    NECK: Thyroid feels slightly less enlarged  CARDIOVASCULAR:  No murmurs  LUNGS: Clear breath sounds  ABDOMEN: Soft, nontender   EXTREMITIES: No clubbing, cyanosis, or edema.   NEUROLOGICAL: No gross focal motor abnormalities   LYMPH: No cervical adenopathy seen   SKIN: No rashes, lesions.       Labs:  Lab Results   Component Value Date/Time    SODIUM 142 08/11/2023 11:15 AM    POTASSIUM 4.5 08/11/2023 11:15 AM    CHLORIDE " 104 2023 11:15 AM    CO2 25 2023 11:15 AM    ANION 13.0 2023 11:15 AM    GLUCOSE 93 2023 11:15 AM    BUN 13 2023 11:15 AM    CREATININE 0.96 2023 11:15 AM    CREATININE 1.1 2008 11:59 AM    CALCIUM 9.5 2023 11:15 AM    ASTSGOT 20 2023 11:15 AM    ALTSGPT 20 2023 11:15 AM    TBILIRUBIN 0.5 2023 11:15 AM    ALBUMIN 4.3 2023 11:15 AM    TOTPROTEIN 6.8 2023 11:15 AM    GLOBULIN 2.5 2023 11:15 AM    AGRATIO 1.7 2023 11:15 AM       Lab Results   Component Value Date/Time    SODIUM 138 2019 0937    POTASSIUM 4.4 2019 0937    CHLORIDE 102 2019 0937    CO2 22 2019 0937    GLUCOSE 95 2019 0937    BUN 17 2019 0937    CREATININE 1.15 (H) 2019 0937    CALCIUM 9.3 2019 0937    ANION 6.0 08/10/2016 1522       Lab Results   Component Value Date/Time    CHOLSTRLTOT 177 2019 1015    TRIGLYCERIDE 109 2019 1015    HDL 40 2019 1015     (H) 2019 1015       Lab Results   Component Value Date/Time    TSHULTRASEN 1.160 08/15/2017 0832     Lab Results   Component Value Date/Time    FREET4 0.88 08/15/2017 0832     No results found for: FREET3  No results found for: THYSTIMIG    No results found for: MICROSOMALA      Imagin/23/2023 10:30 AM     HISTORY/REASON FOR EXAM:  Follow-up for hyperthyroidism secondary Hashimoto's and thyroid nodule        TECHNIQUE/EXAM DESCRIPTION:  Ultrasound of the soft tissues of the head and neck.     COMPARISON:  Thyroid ultrasound 2022     FINDINGS:  The thyroid gland is heterogeneous.  Vascularity is normal.     The right lobe of the thyroid gland measures 3.48 cm x 1.25 cm x 1.10 cm cm.  The left lobe of the thyroid gland measures 3.59 cm x 1.01 cm x 0.88 cm cm.  The isthmus measures 0.19 cm cm.     Nodules >= 1cm:        Nodule #1  There is a(n) hypoechoic wider than tall solid nodule with smooth margins and no echogenic foci  measuring 1.00 x 0.55 x 0.63 cm located on the right lower lobe.        There are no suspicious lateral neck nodes seen     IMPRESSION:     Heterogenous thyroid gland with stable intermediate suspicion hypoechoic solid nodule in the right lower lobe with smooth margins     EMILY Recommendations  Observation - repeat US in 12 to 24 months in the office.              US report completed and dictated by  Cordell Cardoza MD, FACE, ECNU      ASSESSMENT/PLAN:     1. Hypothyroidism due to Hashimoto's thyroiditis  Controlled,   Her TSH is slightly low but this is within acceptable limits  Free T4 and free T3 levels are normal  She denies hyperthyroid symptoms  Her TSH is also low because she is taking Cytomel  Continue  Synthroid 112 and Cytomel 5 twice a day  Follow-up in 6 months for repeat ultrasound in the office    2. Thyroid nodule  Stable  Low risk isoechoic to slightly hypoechoic nodule measuring less than 1 cm in the right lower lobe  Recommend observation repeating ultrasound in 6 mos    3. Pre-diabetes  Controlled  A1c stable  Continue Metformin extended release 1000 mg twice a day  I am repeating her A1c in 12 months    4. Vitamin D deficiency  Controlled  Continue ergocalciferol 50,000 units weekly   Continue monitoring    5. B12 deficiency  Stable  Continue monitoring      Return in about 6 months (around 2/15/2024).      Thank you kindly for allowing me to participate in the thyroid care plan for this patient.    Cordell Cardoza MD, LOIS, HERNÁN    CC:   Crow DE LEON M.D.

## 2023-08-16 LAB — 21HYDROXYLASE AB SER QL: NEGATIVE

## 2023-08-18 RX ORDER — LEVOTHYROXINE SODIUM 112 MCG
TABLET ORAL
Qty: 90 TABLET | Refills: 1 | Status: SHIPPED | OUTPATIENT
Start: 2023-08-18 | End: 2024-02-12

## 2023-11-08 DIAGNOSIS — R73.03 PRE-DIABETES: ICD-10-CM

## 2023-12-11 ENCOUNTER — HOSPITAL ENCOUNTER (OUTPATIENT)
Dept: LAB | Facility: MEDICAL CENTER | Age: 53
End: 2023-12-11
Payer: COMMERCIAL

## 2023-12-11 LAB
ALBUMIN SERPL BCP-MCNC: 4.1 G/DL (ref 3.2–4.9)
ALBUMIN/GLOB SERPL: 1.6 G/DL
ALP SERPL-CCNC: 52 U/L (ref 30–99)
ALT SERPL-CCNC: 25 U/L (ref 2–50)
ANION GAP SERPL CALC-SCNC: 9 MMOL/L (ref 7–16)
AST SERPL-CCNC: 25 U/L (ref 12–45)
BASOPHILS # BLD AUTO: 0.7 % (ref 0–1.8)
BASOPHILS # BLD: 0.05 K/UL (ref 0–0.12)
BILIRUB SERPL-MCNC: 0.3 MG/DL (ref 0.1–1.5)
BUN SERPL-MCNC: 13 MG/DL (ref 8–22)
CALCIUM ALBUM COR SERPL-MCNC: 9 MG/DL (ref 8.5–10.5)
CALCIUM SERPL-MCNC: 9.1 MG/DL (ref 8.5–10.5)
CHLORIDE SERPL-SCNC: 104 MMOL/L (ref 96–112)
CHOLEST SERPL-MCNC: 181 MG/DL (ref 100–199)
CO2 SERPL-SCNC: 27 MMOL/L (ref 20–33)
CREAT SERPL-MCNC: 1.06 MG/DL (ref 0.5–1.4)
EOSINOPHIL # BLD AUTO: 0.06 K/UL (ref 0–0.51)
EOSINOPHIL NFR BLD: 0.9 % (ref 0–6.9)
ERYTHROCYTE [DISTWIDTH] IN BLOOD BY AUTOMATED COUNT: 47.3 FL (ref 35.9–50)
EST. AVERAGE GLUCOSE BLD GHB EST-MCNC: 123 MG/DL
GFR SERPLBLD CREATININE-BSD FMLA CKD-EPI: 62 ML/MIN/1.73 M 2
GLOBULIN SER CALC-MCNC: 2.6 G/DL (ref 1.9–3.5)
GLUCOSE SERPL-MCNC: 88 MG/DL (ref 65–99)
HBA1C MFR BLD: 5.9 % (ref 4–5.6)
HCT VFR BLD AUTO: 41.7 % (ref 37–47)
HDLC SERPL-MCNC: 48 MG/DL
HGB BLD-MCNC: 13.4 G/DL (ref 12–16)
IMM GRANULOCYTES # BLD AUTO: 0 K/UL (ref 0–0.11)
IMM GRANULOCYTES NFR BLD AUTO: 0 % (ref 0–0.9)
LDLC SERPL CALC-MCNC: 113 MG/DL
LYMPHOCYTES # BLD AUTO: 2.49 K/UL (ref 1–4.8)
LYMPHOCYTES NFR BLD: 37.2 % (ref 22–41)
MCH RBC QN AUTO: 29.7 PG (ref 27–33)
MCHC RBC AUTO-ENTMCNC: 32.1 G/DL (ref 32.2–35.5)
MCV RBC AUTO: 92.5 FL (ref 81.4–97.8)
MONOCYTES # BLD AUTO: 0.32 K/UL (ref 0–0.85)
MONOCYTES NFR BLD AUTO: 4.8 % (ref 0–13.4)
NEUTROPHILS # BLD AUTO: 3.78 K/UL (ref 1.82–7.42)
NEUTROPHILS NFR BLD: 56.4 % (ref 44–72)
NRBC # BLD AUTO: 0 K/UL
NRBC BLD-RTO: 0 /100 WBC (ref 0–0.2)
PLATELET # BLD AUTO: 264 K/UL (ref 164–446)
PMV BLD AUTO: 11.2 FL (ref 9–12.9)
POTASSIUM SERPL-SCNC: 4.5 MMOL/L (ref 3.6–5.5)
PROT SERPL-MCNC: 6.7 G/DL (ref 6–8.2)
RBC # BLD AUTO: 4.51 M/UL (ref 4.2–5.4)
SODIUM SERPL-SCNC: 140 MMOL/L (ref 135–145)
TRIGL SERPL-MCNC: 98 MG/DL (ref 0–149)
WBC # BLD AUTO: 6.7 K/UL (ref 4.8–10.8)

## 2023-12-11 PROCEDURE — 84443 ASSAY THYROID STIM HORMONE: CPT

## 2023-12-11 PROCEDURE — 82306 VITAMIN D 25 HYDROXY: CPT

## 2023-12-11 PROCEDURE — 83036 HEMOGLOBIN GLYCOSYLATED A1C: CPT

## 2023-12-11 PROCEDURE — 80061 LIPID PANEL: CPT

## 2023-12-11 PROCEDURE — 85025 COMPLETE CBC W/AUTO DIFF WBC: CPT

## 2023-12-11 PROCEDURE — 36415 COLL VENOUS BLD VENIPUNCTURE: CPT

## 2023-12-11 PROCEDURE — 80053 COMPREHEN METABOLIC PANEL: CPT

## 2023-12-12 LAB
25(OH)D3 SERPL-MCNC: 50 NG/ML (ref 30–100)
TSH SERPL DL<=0.005 MIU/L-ACNC: 0.12 UIU/ML (ref 0.38–5.33)

## 2023-12-15 ENCOUNTER — HOSPITAL ENCOUNTER (OUTPATIENT)
Dept: LAB | Facility: MEDICAL CENTER | Age: 53
End: 2023-12-15
Payer: COMMERCIAL

## 2023-12-15 PROCEDURE — 87086 URINE CULTURE/COLONY COUNT: CPT

## 2023-12-15 PROCEDURE — 81513 NFCT DS BV RNA VAG FLU ALG: CPT

## 2023-12-15 PROCEDURE — 87481 CANDIDA DNA AMP PROBE: CPT

## 2023-12-15 PROCEDURE — 87186 SC STD MICRODIL/AGAR DIL: CPT

## 2023-12-15 PROCEDURE — 87077 CULTURE AEROBIC IDENTIFY: CPT

## 2023-12-15 PROCEDURE — 87661 TRICHOMONAS VAGINALIS AMPLIF: CPT

## 2023-12-17 LAB
BACTERIA UR CULT: ABNORMAL
BACTERIA UR CULT: ABNORMAL
SIGNIFICANT IND 70042: ABNORMAL
SITE SITE: ABNORMAL
SOURCE SOURCE: ABNORMAL

## 2023-12-18 LAB — TEST NAME 95000: NORMAL

## 2024-01-22 ENCOUNTER — PROCEDURE VISIT (OUTPATIENT)
Dept: ENDOCRINOLOGY | Facility: MEDICAL CENTER | Age: 54
End: 2024-01-22
Attending: INTERNAL MEDICINE
Payer: COMMERCIAL

## 2024-01-22 DIAGNOSIS — E55.9 VITAMIN D DEFICIENCY: ICD-10-CM

## 2024-01-22 DIAGNOSIS — E04.1 THYROID NODULE: ICD-10-CM

## 2024-01-22 DIAGNOSIS — E06.3 HYPOTHYROIDISM DUE TO HASHIMOTO'S THYROIDITIS: ICD-10-CM

## 2024-01-22 DIAGNOSIS — R73.03 PRE-DIABETES: ICD-10-CM

## 2024-01-22 DIAGNOSIS — E03.8 HYPOTHYROIDISM DUE TO HASHIMOTO'S THYROIDITIS: ICD-10-CM

## 2024-01-22 PROCEDURE — 76536 US EXAM OF HEAD AND NECK: CPT | Performed by: INTERNAL MEDICINE

## 2024-01-22 NOTE — PROGRESS NOTES
Thyroid US done on this procedure visit  She has a stable RLL solid nodule with no suspicious features  Recommend observation  Please see dictated POC US report completed by endocrinologist  Patient advised to follow up as planned with labs prior    Cordell Cardoza M.D.

## 2024-01-23 ENCOUNTER — RESEARCH ENCOUNTER (OUTPATIENT)
Dept: RESEARCH | Facility: MEDICAL CENTER | Age: 54
End: 2024-01-23
Payer: COMMERCIAL

## 2024-01-23 DIAGNOSIS — Z00.6 RESEARCH STUDY PATIENT: ICD-10-CM

## 2024-01-23 NOTE — RESEARCH NOTE
Confirmed with the participant which designated provider they would like study results shared with. Patient will have an opportunity to share the results with any providers of their choosing in the future by accessing their results from [facility].

## 2024-01-24 ENCOUNTER — HOSPITAL ENCOUNTER (OUTPATIENT)
Dept: LAB | Facility: MEDICAL CENTER | Age: 54
End: 2024-01-24
Attending: FAMILY MEDICINE
Payer: COMMERCIAL

## 2024-01-24 DIAGNOSIS — Z00.6 RESEARCH STUDY PATIENT: ICD-10-CM

## 2024-01-26 LAB
ELF SCORE: 8.9 PPM (ref 9.8–11.3)
RELATIVE RISK: NORMAL
RISK GROUP: NORMAL
RISK: 3.3 %

## 2024-02-05 ENCOUNTER — HOSPITAL ENCOUNTER (OUTPATIENT)
Dept: RADIOLOGY | Facility: MEDICAL CENTER | Age: 54
End: 2024-02-05
Payer: COMMERCIAL

## 2024-02-05 DIAGNOSIS — Z12.31 VISIT FOR SCREENING MAMMOGRAM: ICD-10-CM

## 2024-02-05 PROCEDURE — 77067 SCR MAMMO BI INCL CAD: CPT

## 2024-02-11 DIAGNOSIS — E06.3 HYPOTHYROIDISM DUE TO HASHIMOTO'S THYROIDITIS: ICD-10-CM

## 2024-02-11 DIAGNOSIS — E03.8 HYPOTHYROIDISM DUE TO HASHIMOTO'S THYROIDITIS: ICD-10-CM

## 2024-02-12 RX ORDER — LEVOTHYROXINE SODIUM 112 MCG
TABLET ORAL
Qty: 90 TABLET | Refills: 1 | Status: SHIPPED | OUTPATIENT
Start: 2024-02-12

## 2024-02-21 LAB
APOB+LDLR+PCSK9 GENE MUT ANL BLD/T: NOT DETECTED
BRCA1+BRCA2 DEL+DUP + FULL MUT ANL BLD/T: NOT DETECTED
MLH1+MSH2+MSH6+PMS2 GN DEL+DUP+FUL M: NOT DETECTED

## 2024-03-18 ENCOUNTER — TELEPHONE (OUTPATIENT)
Dept: OBGYN | Facility: CLINIC | Age: 54
End: 2024-03-18
Payer: COMMERCIAL

## 2024-03-18 NOTE — TELEPHONE ENCOUNTER
Medical Assistant for Dr. Callie Crawley's office called wanting to know about a referral for pt from 12/18/2023 pt was in their clinic today, notified MA that I would send a message to Keisha our surgery coordinator for Dr. Yung to review referral.

## 2024-05-14 DIAGNOSIS — R73.03 PRE-DIABETES: ICD-10-CM

## 2024-05-14 RX ORDER — METFORMIN HYDROCHLORIDE 500 MG/1
1000 TABLET, EXTENDED RELEASE ORAL 2 TIMES DAILY
Qty: 360 TABLET | Refills: 0 | Status: SHIPPED | OUTPATIENT
Start: 2024-05-14

## 2024-06-21 ENCOUNTER — PHARMACY VISIT (OUTPATIENT)
Dept: PHARMACY | Facility: MEDICAL CENTER | Age: 54
End: 2024-06-21
Payer: COMMERCIAL

## 2024-06-21 PROCEDURE — RXMED WILLOW AMBULATORY MEDICATION CHARGE: Performed by: ANESTHESIOLOGY

## 2024-06-21 RX ORDER — LEVOTHYROXINE SODIUM 112 MCG
TABLET ORAL
Qty: 90 TABLET | Refills: 3 | OUTPATIENT
Start: 2024-06-21

## 2024-06-28 DIAGNOSIS — E06.3 HYPOTHYROIDISM DUE TO HASHIMOTO'S THYROIDITIS: ICD-10-CM

## 2024-06-28 DIAGNOSIS — E03.8 HYPOTHYROIDISM DUE TO HASHIMOTO'S THYROIDITIS: ICD-10-CM

## 2024-06-30 RX ORDER — LIOTHYRONINE SODIUM 5 UG/1
5 TABLET ORAL 2 TIMES DAILY
Qty: 180 TABLET | Refills: 1 | Status: SHIPPED | OUTPATIENT
Start: 2024-06-30

## 2024-07-22 ENCOUNTER — TELEPHONE (OUTPATIENT)
Dept: ENDOCRINOLOGY | Facility: MEDICAL CENTER | Age: 54
End: 2024-07-22
Payer: COMMERCIAL

## 2024-07-23 ENCOUNTER — HOSPITAL ENCOUNTER (OUTPATIENT)
Dept: LAB | Facility: MEDICAL CENTER | Age: 54
End: 2024-07-23
Attending: INTERNAL MEDICINE
Payer: COMMERCIAL

## 2024-07-23 DIAGNOSIS — E04.1 THYROID NODULE: ICD-10-CM

## 2024-07-23 DIAGNOSIS — E03.8 HYPOTHYROIDISM DUE TO HASHIMOTO'S THYROIDITIS: ICD-10-CM

## 2024-07-23 DIAGNOSIS — E06.3 HYPOTHYROIDISM DUE TO HASHIMOTO'S THYROIDITIS: ICD-10-CM

## 2024-07-23 LAB
25(OH)D3 SERPL-MCNC: 45 NG/ML (ref 30–100)
ALBUMIN SERPL BCP-MCNC: 4 G/DL (ref 3.2–4.9)
ALBUMIN/GLOB SERPL: 2 G/DL
ALP SERPL-CCNC: 47 U/L (ref 30–99)
ALT SERPL-CCNC: 20 U/L (ref 2–50)
ANION GAP SERPL CALC-SCNC: 12 MMOL/L (ref 7–16)
AST SERPL-CCNC: 19 U/L (ref 12–45)
BILIRUB SERPL-MCNC: 0.4 MG/DL (ref 0.1–1.5)
BUN SERPL-MCNC: 13 MG/DL (ref 8–22)
CALCIUM ALBUM COR SERPL-MCNC: 8.9 MG/DL (ref 8.5–10.5)
CALCIUM SERPL-MCNC: 8.9 MG/DL (ref 8.5–10.5)
CHLORIDE SERPL-SCNC: 104 MMOL/L (ref 96–112)
CO2 SERPL-SCNC: 24 MMOL/L (ref 20–33)
CREAT SERPL-MCNC: 0.79 MG/DL (ref 0.5–1.4)
GFR SERPLBLD CREATININE-BSD FMLA CKD-EPI: 88 ML/MIN/1.73 M 2
GLOBULIN SER CALC-MCNC: 2 G/DL (ref 1.9–3.5)
GLUCOSE SERPL-MCNC: 97 MG/DL (ref 65–99)
POTASSIUM SERPL-SCNC: 4.4 MMOL/L (ref 3.6–5.5)
PROT SERPL-MCNC: 6 G/DL (ref 6–8.2)
SODIUM SERPL-SCNC: 140 MMOL/L (ref 135–145)
T3FREE SERPL-MCNC: 2.8 PG/ML (ref 2–4.4)
T4 FREE SERPL-MCNC: 1.36 NG/DL (ref 0.93–1.7)
TSH SERPL-ACNC: 0.09 UIU/ML (ref 0.35–5.5)

## 2024-07-23 PROCEDURE — 80053 COMPREHEN METABOLIC PANEL: CPT

## 2024-07-23 PROCEDURE — 84443 ASSAY THYROID STIM HORMONE: CPT

## 2024-07-23 PROCEDURE — 36415 COLL VENOUS BLD VENIPUNCTURE: CPT

## 2024-07-23 PROCEDURE — 82306 VITAMIN D 25 HYDROXY: CPT

## 2024-07-23 PROCEDURE — 84481 FREE ASSAY (FT-3): CPT

## 2024-07-23 PROCEDURE — 84439 ASSAY OF FREE THYROXINE: CPT

## 2024-07-31 ENCOUNTER — OFFICE VISIT (OUTPATIENT)
Dept: ENDOCRINOLOGY | Facility: MEDICAL CENTER | Age: 54
End: 2024-07-31
Attending: INTERNAL MEDICINE
Payer: COMMERCIAL

## 2024-07-31 VITALS
WEIGHT: 222.3 LBS | DIASTOLIC BLOOD PRESSURE: 70 MMHG | OXYGEN SATURATION: 100 % | HEART RATE: 87 BPM | SYSTOLIC BLOOD PRESSURE: 110 MMHG | BODY MASS INDEX: 31.82 KG/M2 | HEIGHT: 70 IN

## 2024-07-31 DIAGNOSIS — E55.9 VITAMIN D DEFICIENCY: ICD-10-CM

## 2024-07-31 DIAGNOSIS — E03.8 HYPOTHYROIDISM DUE TO HASHIMOTO'S THYROIDITIS: ICD-10-CM

## 2024-07-31 DIAGNOSIS — E04.1 THYROID NODULE: ICD-10-CM

## 2024-07-31 DIAGNOSIS — R73.03 PRE-DIABETES: ICD-10-CM

## 2024-07-31 DIAGNOSIS — E06.3 HYPOTHYROIDISM DUE TO HASHIMOTO'S THYROIDITIS: ICD-10-CM

## 2024-07-31 PROCEDURE — 99211 OFF/OP EST MAY X REQ PHY/QHP: CPT | Performed by: INTERNAL MEDICINE

## 2024-07-31 RX ORDER — METFORMIN HYDROCHLORIDE 500 MG/1
1000 TABLET, EXTENDED RELEASE ORAL 2 TIMES DAILY
Qty: 360 TABLET | Refills: 3 | Status: SHIPPED | OUTPATIENT
Start: 2024-07-31

## 2024-07-31 RX ORDER — LIOTHYRONINE SODIUM 5 UG/1
5 TABLET ORAL 2 TIMES DAILY
Qty: 180 TABLET | Refills: 3 | Status: SHIPPED | OUTPATIENT
Start: 2024-07-31

## 2024-07-31 RX ORDER — ERGOCALCIFEROL 1.25 MG/1
CAPSULE ORAL
Qty: 12 CAPSULE | Refills: 3 | Status: SHIPPED | OUTPATIENT
Start: 2024-07-31

## 2024-07-31 RX ORDER — LEVOTHYROXINE SODIUM 112 MCG
TABLET ORAL
Qty: 90 TABLET | Refills: 3 | Status: SHIPPED | OUTPATIENT
Start: 2024-07-31

## 2024-07-31 ASSESSMENT — FIBROSIS 4 INDEX: FIB4 SCORE: 0.89

## 2024-09-19 PROCEDURE — RXMED WILLOW AMBULATORY MEDICATION CHARGE: Performed by: ANESTHESIOLOGY

## 2024-09-20 ENCOUNTER — PHARMACY VISIT (OUTPATIENT)
Dept: PHARMACY | Facility: MEDICAL CENTER | Age: 54
End: 2024-09-20
Payer: COMMERCIAL

## 2024-12-17 PROCEDURE — RXMED WILLOW AMBULATORY MEDICATION CHARGE: Performed by: ANESTHESIOLOGY

## 2024-12-22 ENCOUNTER — PHARMACY VISIT (OUTPATIENT)
Dept: PHARMACY | Facility: MEDICAL CENTER | Age: 54
End: 2024-12-22
Payer: COMMERCIAL

## 2025-02-12 ENCOUNTER — HOSPITAL ENCOUNTER (OUTPATIENT)
Dept: RADIOLOGY | Facility: MEDICAL CENTER | Age: 55
End: 2025-02-12
Attending: FAMILY MEDICINE
Payer: COMMERCIAL

## 2025-02-12 DIAGNOSIS — Z12.31 VISIT FOR SCREENING MAMMOGRAM: ICD-10-CM

## 2025-02-12 PROCEDURE — 77067 SCR MAMMO BI INCL CAD: CPT

## 2025-03-17 PROCEDURE — RXMED WILLOW AMBULATORY MEDICATION CHARGE: Performed by: ANESTHESIOLOGY

## 2025-03-18 ENCOUNTER — PHARMACY VISIT (OUTPATIENT)
Dept: PHARMACY | Facility: MEDICAL CENTER | Age: 55
End: 2025-03-18
Payer: COMMERCIAL

## 2025-06-07 PROCEDURE — RXMED WILLOW AMBULATORY MEDICATION CHARGE: Performed by: ANESTHESIOLOGY

## 2025-06-09 DIAGNOSIS — R73.03 PRE-DIABETES: ICD-10-CM

## 2025-06-09 RX ORDER — METFORMIN HYDROCHLORIDE 500 MG/1
1000 TABLET, EXTENDED RELEASE ORAL 2 TIMES DAILY
Qty: 360 TABLET | Refills: 3 | Status: SHIPPED | OUTPATIENT
Start: 2025-06-09

## 2025-06-12 ENCOUNTER — PHARMACY VISIT (OUTPATIENT)
Dept: PHARMACY | Facility: MEDICAL CENTER | Age: 55
End: 2025-06-12
Payer: COMMERCIAL

## 2025-07-14 ENCOUNTER — HOSPITAL ENCOUNTER (OUTPATIENT)
Facility: MEDICAL CENTER | Age: 55
End: 2025-07-14
Attending: INTERNAL MEDICINE
Payer: COMMERCIAL

## 2025-07-14 DIAGNOSIS — E06.3 HYPOTHYROIDISM DUE TO HASHIMOTO'S THYROIDITIS: ICD-10-CM

## 2025-07-14 DIAGNOSIS — E04.1 THYROID NODULE: ICD-10-CM

## 2025-07-14 DIAGNOSIS — E55.9 VITAMIN D DEFICIENCY: ICD-10-CM

## 2025-07-14 DIAGNOSIS — R73.03 PRE-DIABETES: ICD-10-CM

## 2025-07-14 LAB
25(OH)D3 SERPL-MCNC: 52 NG/ML (ref 30–100)
ALBUMIN SERPL BCP-MCNC: 4 G/DL (ref 3.2–4.9)
ALBUMIN/GLOB SERPL: 1.5 G/DL
ALP SERPL-CCNC: 44 U/L (ref 30–99)
ALT SERPL-CCNC: 17 U/L (ref 2–50)
ANION GAP SERPL CALC-SCNC: 13 MMOL/L (ref 7–16)
AST SERPL-CCNC: 24 U/L (ref 12–45)
BILIRUB SERPL-MCNC: <0.2 MG/DL (ref 0.1–1.5)
BUN SERPL-MCNC: 14 MG/DL (ref 8–22)
CALCIUM ALBUM COR SERPL-MCNC: 9.4 MG/DL (ref 8.5–10.5)
CALCIUM SERPL-MCNC: 9.4 MG/DL (ref 8.5–10.5)
CHLORIDE SERPL-SCNC: 104 MMOL/L (ref 96–112)
CO2 SERPL-SCNC: 23 MMOL/L (ref 20–33)
CREAT SERPL-MCNC: 1.01 MG/DL (ref 0.5–1.4)
EST. AVERAGE GLUCOSE BLD GHB EST-MCNC: 114 MG/DL
GFR SERPLBLD CREATININE-BSD FMLA CKD-EPI: 65 ML/MIN/1.73 M 2
GLOBULIN SER CALC-MCNC: 2.6 G/DL (ref 1.9–3.5)
GLUCOSE SERPL-MCNC: 74 MG/DL (ref 65–99)
HBA1C MFR BLD: 5.6 % (ref 4–5.6)
POTASSIUM SERPL-SCNC: 4.3 MMOL/L (ref 3.6–5.5)
PROT SERPL-MCNC: 6.6 G/DL (ref 6–8.2)
SODIUM SERPL-SCNC: 140 MMOL/L (ref 135–145)
T3FREE SERPL-MCNC: 2.79 PG/ML (ref 2–4.4)
T4 FREE SERPL-MCNC: 1.33 NG/DL (ref 0.93–1.7)
TSH SERPL-ACNC: 0.28 UIU/ML (ref 0.38–5.33)

## 2025-07-14 PROCEDURE — 83036 HEMOGLOBIN GLYCOSYLATED A1C: CPT

## 2025-07-14 PROCEDURE — 36415 COLL VENOUS BLD VENIPUNCTURE: CPT

## 2025-07-14 PROCEDURE — 84439 ASSAY OF FREE THYROXINE: CPT

## 2025-07-14 PROCEDURE — 84443 ASSAY THYROID STIM HORMONE: CPT

## 2025-07-14 PROCEDURE — 84481 FREE ASSAY (FT-3): CPT

## 2025-07-14 PROCEDURE — 80053 COMPREHEN METABOLIC PANEL: CPT

## 2025-07-14 PROCEDURE — 82306 VITAMIN D 25 HYDROXY: CPT

## 2025-08-02 DIAGNOSIS — E06.3 HYPOTHYROIDISM DUE TO HASHIMOTO'S THYROIDITIS: ICD-10-CM

## 2025-08-03 RX ORDER — LIOTHYRONINE SODIUM 5 UG/1
5 TABLET ORAL 2 TIMES DAILY
Qty: 180 TABLET | Refills: 3 | Status: SHIPPED | OUTPATIENT
Start: 2025-08-03 | End: 2025-08-06 | Stop reason: SDUPTHER

## 2025-08-06 ENCOUNTER — OFFICE VISIT (OUTPATIENT)
Dept: ENDOCRINOLOGY | Facility: MEDICAL CENTER | Age: 55
End: 2025-08-06
Attending: INTERNAL MEDICINE
Payer: COMMERCIAL

## 2025-08-06 VITALS
DIASTOLIC BLOOD PRESSURE: 72 MMHG | BODY MASS INDEX: 31.21 KG/M2 | SYSTOLIC BLOOD PRESSURE: 118 MMHG | HEART RATE: 60 BPM | OXYGEN SATURATION: 98 % | HEIGHT: 70 IN | WEIGHT: 218 LBS

## 2025-08-06 DIAGNOSIS — E55.9 VITAMIN D DEFICIENCY: ICD-10-CM

## 2025-08-06 DIAGNOSIS — E04.1 THYROID NODULE: ICD-10-CM

## 2025-08-06 DIAGNOSIS — E06.3 HYPOTHYROIDISM DUE TO HASHIMOTO'S THYROIDITIS: Primary | ICD-10-CM

## 2025-08-06 DIAGNOSIS — R73.03 PRE-DIABETES: ICD-10-CM

## 2025-08-06 PROCEDURE — 3074F SYST BP LT 130 MM HG: CPT | Performed by: INTERNAL MEDICINE

## 2025-08-06 PROCEDURE — 3078F DIAST BP <80 MM HG: CPT | Performed by: INTERNAL MEDICINE

## 2025-08-06 PROCEDURE — 99213 OFFICE O/P EST LOW 20 MIN: CPT | Performed by: INTERNAL MEDICINE

## 2025-08-06 PROCEDURE — 99214 OFFICE O/P EST MOD 30 MIN: CPT | Performed by: INTERNAL MEDICINE

## 2025-08-06 RX ORDER — LEVOTHYROXINE SODIUM 112 MCG
TABLET ORAL
Qty: 90 TABLET | Refills: 3 | Status: SHIPPED | OUTPATIENT
Start: 2025-08-06

## 2025-08-06 RX ORDER — ERGOCALCIFEROL 1.25 MG/1
CAPSULE, LIQUID FILLED ORAL
Qty: 12 CAPSULE | Refills: 3 | Status: SHIPPED | OUTPATIENT
Start: 2025-08-06

## 2025-08-06 RX ORDER — LIOTHYRONINE SODIUM 5 UG/1
5 TABLET ORAL 2 TIMES DAILY
Qty: 180 TABLET | Refills: 3 | Status: SHIPPED | OUTPATIENT
Start: 2025-08-06

## 2025-08-06 RX ORDER — METFORMIN HYDROCHLORIDE 500 MG/1
1000 TABLET, EXTENDED RELEASE ORAL 2 TIMES DAILY
Qty: 360 TABLET | Refills: 3 | Status: SHIPPED | OUTPATIENT
Start: 2025-08-06

## 2025-08-06 ASSESSMENT — FIBROSIS 4 INDEX: FIB4 SCORE: 1.23
